# Patient Record
Sex: FEMALE | Race: WHITE | NOT HISPANIC OR LATINO | Employment: STUDENT | URBAN - METROPOLITAN AREA
[De-identification: names, ages, dates, MRNs, and addresses within clinical notes are randomized per-mention and may not be internally consistent; named-entity substitution may affect disease eponyms.]

---
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
## 2018-03-03 ENCOUNTER — HOSPITAL ENCOUNTER (EMERGENCY)
Facility: HOSPITAL | Age: 18
Discharge: HOME | End: 2018-03-03
Attending: EMERGENCY MEDICINE
Payer: MEDICAID

## 2018-03-03 VITALS
DIASTOLIC BLOOD PRESSURE: 73 MMHG | WEIGHT: 227.38 LBS | RESPIRATION RATE: 18 BRPM | BODY MASS INDEX: 42.93 KG/M2 | HEART RATE: 90 BPM | OXYGEN SATURATION: 98 % | TEMPERATURE: 97 F | HEIGHT: 61 IN | SYSTOLIC BLOOD PRESSURE: 127 MMHG

## 2018-03-03 DIAGNOSIS — J00 ACUTE NASOPHARYNGITIS: Primary | ICD-10-CM

## 2018-03-03 DIAGNOSIS — R51.9 FRONTAL HEADACHE: ICD-10-CM

## 2018-03-03 DIAGNOSIS — R07.9 CHEST PAIN: ICD-10-CM

## 2018-03-03 DIAGNOSIS — R05.9 COUGH: ICD-10-CM

## 2018-03-03 PROCEDURE — 93010 ELECTROCARDIOGRAM REPORT: CPT | Mod: ,,, | Performed by: NUCLEAR MEDICINE

## 2018-03-03 PROCEDURE — 99283 EMERGENCY DEPT VISIT LOW MDM: CPT | Mod: 25

## 2018-03-03 PROCEDURE — 93005 ELECTROCARDIOGRAM TRACING: CPT

## 2018-03-03 RX ORDER — IBUPROFEN 600 MG/1
600 TABLET ORAL EVERY 6 HOURS PRN
Qty: 30 TABLET | Refills: 0 | Status: SHIPPED | OUTPATIENT
Start: 2018-03-03 | End: 2023-06-19

## 2018-03-03 RX ORDER — PREDNISONE 20 MG/1
TABLET ORAL
Qty: 18 TABLET | Refills: 0 | OUTPATIENT
Start: 2018-03-03 | End: 2020-10-29

## 2018-03-03 RX ORDER — PREDNISONE 20 MG/1
TABLET ORAL
Qty: 18 TABLET | Refills: 0 | Status: SHIPPED | OUTPATIENT
Start: 2018-03-03 | End: 2018-03-03

## 2018-03-03 RX ORDER — IBUPROFEN 600 MG/1
600 TABLET ORAL EVERY 6 HOURS PRN
Qty: 30 TABLET | Refills: 0 | Status: SHIPPED | OUTPATIENT
Start: 2018-03-03 | End: 2018-03-03

## 2018-03-03 NOTE — ED PROVIDER NOTES
Encounter Date: 3/3/2018       History     Chief Complaint   Patient presents with    Headache     pt reports has history of headaches with Imitrex but hasn't taken in a week    Chest Pain     seen here for same complaint in the past and was dx with anxiety reaction, pt states pain x 1 month, pain is dull and aching pain this time but sharp pain last time      The history is provided by the patient.   Headache    This is a recurrent problem. The current episode started in the past 7 days. The problem occurs intermittently. The problem has been waxing and waning. The pain is located in the frontal and right unilateral region. The pain does not radiate. The pain quality is similar to prior headaches. The quality of the pain is described as pressure. The pain is at a severity of 7/10. Associated symptoms include coughing and sinus pressure. Pertinent negatives include no abdominal pain, anorexia, back pain, blurred vision, dizziness, drainage, ear pain, eye pain, eye redness, eye watering, fever, insomnia, loss of balance, muscle aches, nausea, neck pain, numbness, phonophobia, photophobia, scalp tenderness, sore throat, swollen glands, vomiting or weakness. She has tried nothing (patient denies taking her Imitrex or any other medications for her discomfort) for the symptoms. Her past medical history is significant for migraine headaches and obesity. There is no history of hypertension.   Chest Pain   The current episode started several days ago. Chest pain occurs intermittently. The pain is associated with coughing. The chest pain is currently at 7/10. The quality of the pain is described as aching. The pain does not radiate. Chest pain is worsened by certain positions and deep breathing (and coughing). Primary symptoms include fatigue and cough. Pertinent negatives for primary symptoms include no fever, no shortness of breath, no wheezing, no palpitations, no abdominal pain, no nausea, no vomiting and no dizziness.    The cough began more than 1 week ago. The cough is dry and non-productive.   Pertinent negatives for associated symptoms include no claudication, no diaphoresis, no lower extremity edema, no near-syncope, no numbness, no orthopnea, no paroxysmal nocturnal dyspnea and no weakness. She tried nothing for the symptoms. Risk factors include obesity and oral contraceptive use.   Her past medical history is significant for anxiety/panic attacks.   Pertinent negatives for past medical history include no CAD, no diabetes, no DVT, no hyperlipidemia, no hypertension and no TIA.   Her family medical history is significant for CAD, diabetes, heart disease, hyperlipidemia and hypertension.   URI   The primary symptoms include fatigue, headaches and cough. Primary symptoms do not include fever, ear pain, sore throat, swollen glands, wheezing, abdominal pain, nausea, vomiting, myalgias, arthralgias or rash. The current episode started several weeks ago. This is a recurrent problem. Progression since onset: waxing and waning.   The headache began more than 2 days ago. The headache is present intermittently. The pain from the headache is at a severity of 7/10. Location/region(s) of the headache: frontal and right unilateral. The headache is not associated with photophobia, eye pain or loss of balance.   The cough began more than 1 week ago. The cough is dry and non-productive.   Symptoms associated with the illness include sinus pressure and congestion. The illness is not associated with chills. The following treatments were addressed: Acetaminophen was not tried. A decongestant was not tried. NSAIDs were not tried.         PCP:   [facility]      Review of patient's allergies indicates:  No Known Allergies  Past Medical History:   Diagnosis Date    Anxiety     Bipolar disorder     Migraine headache     Obesity     Restless leg syndrome     Scoliosis      Past Surgical History:   Procedure Laterality Date    CYST  REMOVAL      TYMPANOSTOMY TUBE PLACEMENT      Age 2     Family History   Problem Relation Age of Onset    Cancer Mother      lymph node    Diabetes Mother      Type 2    Obesity Mother     Melanoma Father     Hyperlipidemia Maternal Grandmother     Osteoporosis Maternal Grandmother     Hyperlipidemia Maternal Grandfather     Hypertension Maternal Grandfather     Kidney disease Maternal Grandfather     Lupus Paternal Grandmother      Social History   Substance Use Topics    Smoking status: Never Smoker    Smokeless tobacco: Never Used    Alcohol use No     Review of Systems   Constitutional: Positive for fatigue. Negative for chills, diaphoresis and fever.   HENT: Positive for congestion, postnasal drip and sinus pressure. Negative for ear pain and sore throat.    Eyes: Negative for blurred vision, photophobia, pain and redness.   Respiratory: Positive for cough. Negative for shortness of breath and wheezing.    Cardiovascular: Positive for chest pain (chest wall). Negative for palpitations, orthopnea, claudication and near-syncope.   Gastrointestinal: Negative for abdominal pain, anorexia, diarrhea, nausea and vomiting.   Genitourinary: Negative for dysuria.   Musculoskeletal: Negative for arthralgias, back pain, myalgias and neck pain.   Skin: Negative for rash.   Neurological: Positive for headaches. Negative for dizziness, weakness, light-headedness, numbness and loss of balance.   Hematological: Does not bruise/bleed easily.   Psychiatric/Behavioral: Negative for agitation and confusion. The patient does not have insomnia.        Physical Exam     Initial Vitals [03/03/18 1404]   BP Pulse Resp Temp SpO2   127/73 90 18 97.3 °F (36.3 °C) 98 %      MAP       91         Physical Exam    Nursing note and vitals reviewed.  Constitutional: Vital signs are normal. She appears well-developed and well-nourished. She is Obese . She is cooperative. She does not appear ill. No distress.   HENT:   Head:  Normocephalic and atraumatic.   Right Ear: Hearing, tympanic membrane, external ear and ear canal normal.   Left Ear: Hearing, tympanic membrane, external ear and ear canal normal.   Nose: Mucosal edema present. Right sinus exhibits frontal sinus tenderness.   Mouth/Throat: Uvula is midline, oropharynx is clear and moist and mucous membranes are normal.   Eyes: Conjunctivae, EOM and lids are normal. Pupils are equal, round, and reactive to light.   Neck: Trachea normal and normal range of motion. Neck supple. No spinous process tenderness and no muscular tenderness present. No neck rigidity.   Cardiovascular: Normal rate, regular rhythm, intact distal pulses and normal pulses.   Pulmonary/Chest: Effort normal and breath sounds normal. No accessory muscle usage. No respiratory distress. She has no decreased breath sounds. She has no wheezes. She has no rhonchi. She has no rales. She exhibits tenderness (mild reproducible tenderness to bilateral upper chest wall - no crepitus or deformity noted).   Abdominal: Soft. She exhibits no distension and no mass. There is no tenderness. There is no rebound and no guarding.   Musculoskeletal: Normal range of motion. She exhibits no edema or tenderness.   Lymphadenopathy:     She has no cervical adenopathy.   Neurological: She is alert and oriented to person, place, and time. She has normal strength. Gait normal. GCS eye subscore is 4. GCS verbal subscore is 5. GCS motor subscore is 6.   Neurovascular intact to all extremities.   Skin: Skin is warm, dry and intact. Capillary refill takes less than 2 seconds. No rash noted.   Normal color and turgor.    Psychiatric: She has a normal mood and affect. Her speech is normal and behavior is normal. Thought content normal. Cognition and memory are normal.         ED Course   Procedures    1445 HOURS DISPOSITION: The patient is resting comfortably in no acute distress.  She is hemodynamically stable and is without objective evidence for  acute process requiring urgent intervention or hospitalization. I provided counseling to patient with regard to condition, the treatment plan, specific conditions for return, and the importance of follow up. Detailed written and verbal instructions provided to patient and she expressed a verbal understanding of the discharge instructions and overall management plan. Reiterated the importance of medication administration and safety and advised patient to follow up with primary care provider in 3-5 days or sooner if needed.  Answered questions at this time. The patient is stable for discharge.         EKG Readings: (Independently Interpreted)   Initial Reading: No STEMI. Previous EKG: Compared with most recent EKG Previous EKG Date: 09/26/2013. Rhythm: Normal Sinus Rhythm. Heart Rate: 76. Ectopy: No Ectopy. ST Segments: Normal ST Segments. Axis: Normal. Clinical Impression: Normal Sinus Rhythm          Medical Decision Making:   History:   Old Records Summarized: records from clinic visits.  Clinical Tests:   Medical Tests: Ordered and Reviewed                      Clinical Impression:       ICD-10-CM ICD-9-CM   1. Acute nasopharyngitis J00 460   2. Chest Pain - Noncardiac R07.9 786.50   3. Frontal headache R51 784.0   4. Cough R05 786.2         Disposition:   Disposition: Discharged  Condition: Stable  I discussed with patient that the evaluation in the emergency department does not suggest any emergent or life threatening medical condition requiring immediate intervention beyond what was provided in the ED, and I believe patient is safe for discharge.  Regardless, an unremarkable evaluation in the ED does not preclude the development or presence of a serious of life threatening condition. As such, patient was instructed to return immediately for any worsening or change in current symptoms. I also discussed the results of my evaluation and diagnosis with patient and she concurs with the evaluation and management  plan.  Detailed written and verbal instructions provided to patient and she expressed a verbal understanding of the discharge instructions and overall management plan. Reiterated the importance of medication administration and safety and advised patient to follow up with primary care provider in 3-5 days or sooner if needed.  Also advised patient to return to the ER for any complications.     Regarding UPPER RESPIRATORY ILLNESS, for treatment, I encouraged patient to: drink plenty of fluids; get lots of rest; take medications as prescribed; use over-the-counter medications for symptomatic treatment;  and use a humidifier or steam in the bathroom to improve patency of upper airway.  Patient instructed to notify primary care provider, or return to the emergency department, if the they: have a cough most days or have a cough that returns frequently; begin coughing up blood; develop a high fever or shaking chills; have a low-grade fever for three or more days; develop thick, greenish mucus, especially if it has a bad smell; and experience shortness of breath or chest pain. For prevention, discussed with patient the importance of refraining from smoking (if applicable), getting annual influenza vaccines, reducing exposure to air pollution, and the need to frequently wash hands to avoid spread of infection.     Regarding CHEST PAIN, I advised the patient that chest pain can be caused by a range of conditions, from not serious to life-threatening such as: heart attack or a blood clot in your lungs, angina indicating poor blood flow to the heart; infection, inflammation, or a fracture in the bones or cartilage in chest wall; a digestive problem, such as acid reflux or a stomach ulcer; or even an anxiety attack.  Instructed patient to follow up with primary healthcare provider for reevaluation and possible diagnostic studies to find the actual cause of the chest pain. Patient was instructed to call 911 or go to the nearest  emergency department if they develop any of the following signs of a heart attack: squeezing, pressure, or pain in the chest that lasts longer than five minutes or returns; discomfort or pain in the back, neck, jaw, stomach, or arm; trouble breathing; nausea or vomiting; lightheadedness;  or a sudden cold sweat, especially with chest pain or trouble breathing.  Also return to the emergency department the chest discomfort gets worse (even with medicine); cough or vomit blood; have bowel movements that are black or bloody; cannot stop vomiting; or develop difficulty swallowing.    Patient's headache is consistent with previous headaches and lacks features concerning for emergent or life threatening condition.  I do not suspect SAH, meningitis, increased IC pressure, infectious, toxic, vascular, CNS, or other EMC.  I have discussed this at length with patient.  Regarding treatment, advised patient to take nonsteroidal antiinflammatory medications, acetaminophen, or any medications prescribed as instructed.  To prevent headaches, patient advised to avoid muscle tension (do not stay in one position for long periods of time), avoid eye strain (make sure there is adequate lighting for reading and routine tasks), eat healthy foods, exercise, and do not smoke or drink excessive alcohol.  Patient also advised to avoid overuse of over-the-counter or prescription medications. Patient was instructed to contact primary healthcare provider if: headaches continue to get worse; occur often enough that they affect daily work or normal activities; frequent medication use is needed to manage headaches; headaches that worsen and cause vomiting; or there are any questions or concerns about the condition or care. Advised patient to return to the emergency department or call 911 if they develop a sudden headache that presents suddenly and much worse than usual headaches; have difficulty seeing, speaking, or moving; become confused or have  seizure activity; or develop a fever and stiff neck with the headache.           Discharge Medication List as of 3/3/2018  2:49 PM   Prescription provided for Ibuprofen 600 mg tablet (one tablet by mouth every 6 hours as needed for pain) & Prednisone 20 mg tablet (1 tablet TID x 3 days, 1 tablet BID x 3 days and 1 tablet daily x 3 days)         Follow-up Information     Schedule an appointment as soon as possible for a visit  with [facility] [place].    Contact information:  [id number]  [place] [address] [phone]             Go to  [facility].    Why:  As needed  Contact information:  [address] [facility] [address]                                  Prosper Sung NP  03/03/18 4396

## 2018-03-03 NOTE — ED NOTES
LOC: The patient is awake, alert and aware of environment with an appropriate affect, the patient is oriented x 3 and speaking appropriately.  APPEARANCE: Patient resting comfortably and in no acute distress, patient is clean and well groomed, patient's clothing is properly fastened.  HEENT: Brief WNL. Except right front headache that is tender with palpation.   SKIN: Brief WNL.   MUSCULOSKELETAL: Brief WNL  RESPIRATORY: Brief WNL. Except cough x 1 year.   CARDIAC: Brief WNL. Except diffuse dull, aching chest pain x 1 month, non radiating,   GASTRO: Brief WNL  : Brief WNL  Peripheral Vasc: Brief WNL  NEURO: Brief WNL  PSYCH: Brief WNL

## 2019-08-03 ENCOUNTER — HOSPITAL ENCOUNTER (EMERGENCY)
Facility: HOSPITAL | Age: 19
Discharge: HOME | End: 2019-08-03
Attending: EMERGENCY MEDICINE
Payer: MEDICAID

## 2019-08-03 VITALS
WEIGHT: 241.38 LBS | SYSTOLIC BLOOD PRESSURE: 131 MMHG | RESPIRATION RATE: 20 BRPM | BODY MASS INDEX: 45.61 KG/M2 | TEMPERATURE: 99 F | OXYGEN SATURATION: 99 % | HEART RATE: 77 BPM | DIASTOLIC BLOOD PRESSURE: 73 MMHG

## 2019-08-03 DIAGNOSIS — M62.838 MUSCLE SPASM: Primary | ICD-10-CM

## 2019-08-03 DIAGNOSIS — F41.9 ANXIETY: ICD-10-CM

## 2019-08-03 LAB
ALBUMIN SERPL BCP-MCNC: 3.2 G/DL (ref 3.5–5.2)
ALP SERPL-CCNC: 59 U/L (ref 55–135)
ALT SERPL W/O P-5'-P-CCNC: 11 U/L (ref 10–44)
AMPHET+METHAMPHET UR QL: NEGATIVE
ANION GAP SERPL CALC-SCNC: 7 MMOL/L (ref 8–16)
AST SERPL-CCNC: 10 U/L (ref 10–40)
B-HCG UR QL: NEGATIVE
BARBITURATES UR QL SCN>200 NG/ML: NEGATIVE
BASOPHILS # BLD AUTO: 0.04 K/UL (ref 0–0.2)
BASOPHILS NFR BLD: 0.4 % (ref 0–1.9)
BENZODIAZ UR QL SCN>200 NG/ML: NORMAL
BILIRUB SERPL-MCNC: 0.3 MG/DL (ref 0.1–1)
BUN SERPL-MCNC: 13 MG/DL (ref 6–20)
BZE UR QL SCN: NEGATIVE
CALCIUM SERPL-MCNC: 8.6 MG/DL (ref 8.7–10.5)
CANNABINOIDS UR QL SCN: NEGATIVE
CHLORIDE SERPL-SCNC: 108 MMOL/L (ref 95–110)
CO2 SERPL-SCNC: 24 MMOL/L (ref 23–29)
CREAT SERPL-MCNC: 0.8 MG/DL (ref 0.5–1.4)
CREAT UR-MCNC: 210.5 MG/DL (ref 15–325)
DIFFERENTIAL METHOD: NORMAL
EOSINOPHIL # BLD AUTO: 0.1 K/UL (ref 0–0.5)
EOSINOPHIL NFR BLD: 1.4 % (ref 0–8)
ERYTHROCYTE [DISTWIDTH] IN BLOOD BY AUTOMATED COUNT: 13.3 % (ref 11.5–14.5)
EST. GFR  (AFRICAN AMERICAN): >60 ML/MIN/1.73 M^2
EST. GFR  (NON AFRICAN AMERICAN): >60 ML/MIN/1.73 M^2
GLUCOSE SERPL-MCNC: 99 MG/DL (ref 70–110)
HCT VFR BLD AUTO: 40.3 % (ref 37–48.5)
HGB BLD-MCNC: 12.9 G/DL (ref 12–16)
LYMPHOCYTES # BLD AUTO: 2.8 K/UL (ref 1–4.8)
LYMPHOCYTES NFR BLD: 28.4 % (ref 18–48)
MCH RBC QN AUTO: 29.1 PG (ref 27–31)
MCHC RBC AUTO-ENTMCNC: 32 G/DL (ref 32–36)
MCV RBC AUTO: 91 FL (ref 82–98)
METHADONE UR QL SCN>300 NG/ML: NEGATIVE
MONOCYTES # BLD AUTO: 0.8 K/UL (ref 0.3–1)
MONOCYTES NFR BLD: 8.5 % (ref 4–15)
NEUTROPHILS # BLD AUTO: 6 K/UL (ref 1.8–7.7)
NEUTROPHILS NFR BLD: 61.3 % (ref 38–73)
OPIATES UR QL SCN: NEGATIVE
PCP UR QL SCN>25 NG/ML: NEGATIVE
PLATELET # BLD AUTO: 338 K/UL (ref 150–350)
PMV BLD AUTO: 9.2 FL (ref 9.2–12.9)
POTASSIUM SERPL-SCNC: 4 MMOL/L (ref 3.5–5.1)
PROT SERPL-MCNC: 7 G/DL (ref 6–8.4)
RBC # BLD AUTO: 4.43 M/UL (ref 4–5.4)
SODIUM SERPL-SCNC: 139 MMOL/L (ref 136–145)
TOXICOLOGY INFORMATION: NORMAL
WBC # BLD AUTO: 9.82 K/UL (ref 3.9–12.7)

## 2019-08-03 PROCEDURE — 81025 URINE PREGNANCY TEST: CPT | Mod: ER

## 2019-08-03 PROCEDURE — 96372 THER/PROPH/DIAG INJ SC/IM: CPT | Mod: ER

## 2019-08-03 PROCEDURE — 63600175 PHARM REV CODE 636 W HCPCS: Mod: ER | Performed by: EMERGENCY MEDICINE

## 2019-08-03 PROCEDURE — 99284 EMERGENCY DEPT VISIT MOD MDM: CPT | Mod: 25,ER

## 2019-08-03 PROCEDURE — 80307 DRUG TEST PRSMV CHEM ANLYZR: CPT | Mod: ER

## 2019-08-03 PROCEDURE — 85025 COMPLETE CBC W/AUTO DIFF WBC: CPT | Mod: ER

## 2019-08-03 PROCEDURE — 80053 COMPREHEN METABOLIC PANEL: CPT | Mod: ER

## 2019-08-03 RX ADMIN — LORAZEPAM 1 MG: 2 INJECTION INTRAMUSCULAR; INTRAVENOUS at 01:08

## 2019-08-03 NOTE — ED PROVIDER NOTES
"Encounter Date: 8/3/2019       History     Chief Complaint   Patient presents with    Spasms     c/o muscle spasms w/ being disoriented that began 20-30 mins pta. pt boyfriend states pt stated "feel like i'm having a seizure" denies hx of seizure. pt AAOx4 in triage.      Significant history of anxiety and depression, stopped Wellbutrin two weeks ago because it was causing unspecified problems, still taking her Xanax.  Last and excess yesterday.  Unclear if she is still taking Prozac.  Denies chance of current pregnancy, never pregnant before.  Underlying history of bipolar disease, restless leg syndrome migraines, anxiety, and obesity.  Tonight shortly before arrival while lying down she began having episodes of generalized muscle spasms and stated that she felt like she was having seizure.  No seizure activity witnessed.  No tongue biting or incontinence.  No history of seizures.  Has had similar episodes of spasms in the past.  Denies recent illness, alcohol, drugs, other complaints. On arrival, anxious with intermittent relatively nonspecific generalized muscular contractions or spasms that seem predominantly under voluntary control.    The history is provided by the patient and a significant other. No  was used.     Review of patient's allergies indicates:  No Known Allergies  Past Medical History:   Diagnosis Date    Anxiety     Bipolar disorder     Migraine headache     Obesity     Restless leg syndrome     Scoliosis      Past Surgical History:   Procedure Laterality Date    CYST REMOVAL      TYMPANOSTOMY TUBE PLACEMENT      Age 2     Family History   Problem Relation Age of Onset    Cancer Mother         lymph node    Diabetes Mother         Type 2    Obesity Mother     Melanoma Father     Hyperlipidemia Maternal Grandmother     Osteoporosis Maternal Grandmother     Hyperlipidemia Maternal Grandfather     Hypertension Maternal Grandfather     Kidney disease Maternal " Grandfather     Lupus Paternal Grandmother      Social History     Tobacco Use    Smoking status: Never Smoker    Smokeless tobacco: Never Used   Substance Use Topics    Alcohol use: Yes     Comment: occ    Drug use: No     Review of Systems   Constitutional: Negative for activity change, fatigue and fever.   HENT: Negative for congestion, ear pain, facial swelling, nosebleeds, sinus pressure and sore throat.    Eyes: Negative for pain, discharge, redness and visual disturbance.   Respiratory: Negative for cough, choking, chest tightness, shortness of breath and wheezing.    Cardiovascular: Negative for chest pain, palpitations and leg swelling.   Gastrointestinal: Negative for abdominal distention, abdominal pain, nausea and vomiting.   Endocrine: Negative for heat intolerance, polydipsia and polyuria.   Genitourinary: Negative for difficulty urinating, dysuria, flank pain, hematuria and urgency.   Musculoskeletal: Negative for back pain, gait problem, joint swelling and myalgias.   Skin: Negative for color change and rash.   Allergic/Immunologic: Negative for environmental allergies and food allergies.   Neurological: Negative for dizziness, weakness, numbness and headaches.        See HPI   Hematological: Negative for adenopathy. Does not bruise/bleed easily.   Psychiatric/Behavioral: Negative for agitation and behavioral problems. The patient is nervous/anxious.    All other systems reviewed and are negative.      Physical Exam     Initial Vitals [08/03/19 0048]   BP Pulse Resp Temp SpO2   (!) 146/87 86 20 98.7 °F (37.1 °C) 98 %      MAP       --         Physical Exam    Nursing note and vitals reviewed.  Constitutional: She appears well-developed and well-nourished. She is not diaphoretic. No distress.   Anxious, mildly obese   HENT:   Head: Normocephalic and atraumatic.   Mouth/Throat: No oropharyngeal exudate.   Eyes: Conjunctivae and EOM are normal. Pupils are equal, round, and reactive to light. Right  eye exhibits no discharge. Left eye exhibits no discharge. No scleral icterus.   Neck: Normal range of motion. Neck supple. No thyromegaly present. No tracheal deviation present. No JVD present.   Cardiovascular: Normal rate, regular rhythm, normal heart sounds and intact distal pulses. Exam reveals no gallop and no friction rub.    No murmur heard.  Pulmonary/Chest: Breath sounds normal. No stridor. No respiratory distress. She has no wheezes. She has no rhonchi. She has no rales. She exhibits no tenderness.   Abdominal: Soft. Bowel sounds are normal. She exhibits no distension and no mass. There is no tenderness. There is no rebound and no guarding.   Musculoskeletal: Normal range of motion. She exhibits no edema or tenderness.   Intermittent mild diffuse muscle spasms; no seizure activity observed   Neurological: She is alert and oriented to person, place, and time. She has normal strength.   Skin: Skin is warm and dry. No rash and no abscess noted. No erythema.   Psychiatric: She has a normal mood and affect. Her behavior is normal. Judgment and thought content normal.   Moderately anxious, mildly depressed         ED Course   Procedures  Labs Reviewed   COMPREHENSIVE METABOLIC PANEL - Abnormal; Notable for the following components:       Result Value    Calcium 8.6 (*)     Albumin 3.2 (*)     Anion Gap 7 (*)     All other components within normal limits   CBC W/ AUTO DIFFERENTIAL   PREGNANCY TEST, URINE RAPID   DRUG SCREEN PANEL, URINE EMERGENCY          Imaging Results    None       2:07 AM Calm, resting comfortably, symptoms have resolved.  Discussed in detail.  Will follow up with her doctor in 12 days as already scheduled.  I do not see any evidence for true seizure and have counseled her as such.  Symptoms seem predominantly related to anxiety.                            Clinical Impression:     1. Muscle spasm    2. Anxiety         Disposition:   Disposition: Discharged  Condition:  Stable                        Mehul Aguilar MD  08/03/19 0209

## 2019-08-03 NOTE — DISCHARGE INSTRUCTIONS
_____________    Keep appointment 8/15 as scheduled.    I do not think you are having true seizures.    _______________

## 2019-12-13 ENCOUNTER — HOSPITAL ENCOUNTER (EMERGENCY)
Facility: HOSPITAL | Age: 19
Discharge: HOME | End: 2019-12-13
Attending: EMERGENCY MEDICINE
Payer: MEDICAID

## 2019-12-13 VITALS
WEIGHT: 239.63 LBS | RESPIRATION RATE: 20 BRPM | OXYGEN SATURATION: 96 % | BODY MASS INDEX: 45.28 KG/M2 | TEMPERATURE: 101 F | HEART RATE: 113 BPM | SYSTOLIC BLOOD PRESSURE: 121 MMHG | DIASTOLIC BLOOD PRESSURE: 71 MMHG

## 2019-12-13 DIAGNOSIS — B34.9 VIRAL SYNDROME: ICD-10-CM

## 2019-12-13 DIAGNOSIS — J10.1 INFLUENZA B: Primary | ICD-10-CM

## 2019-12-13 LAB
INFLUENZA A, MOLECULAR: NEGATIVE
INFLUENZA B, MOLECULAR: POSITIVE
SPECIMEN SOURCE: ABNORMAL

## 2019-12-13 PROCEDURE — 25000003 PHARM REV CODE 250: Mod: ER | Performed by: EMERGENCY MEDICINE

## 2019-12-13 PROCEDURE — 87502 INFLUENZA DNA AMP PROBE: CPT | Mod: ER

## 2019-12-13 PROCEDURE — 99283 EMERGENCY DEPT VISIT LOW MDM: CPT | Mod: ER

## 2019-12-13 RX ORDER — OSELTAMIVIR PHOSPHATE 75 MG/1
75 CAPSULE ORAL 2 TIMES DAILY
Qty: 10 CAPSULE | Refills: 0 | Status: SHIPPED | OUTPATIENT
Start: 2019-12-13 | End: 2019-12-18

## 2019-12-13 RX ORDER — IBUPROFEN 200 MG
600 TABLET ORAL
Status: COMPLETED | OUTPATIENT
Start: 2019-12-13 | End: 2019-12-13

## 2019-12-13 RX ADMIN — IBUPROFEN 600 MG: 200 TABLET, FILM COATED ORAL at 11:12

## 2019-12-13 NOTE — ED PROVIDER NOTES
Encounter Date: 12/13/2019       History     Chief Complaint   Patient presents with    Chills     thinks she has the flu     The history is provided by the patient.   Fever   Primary symptoms of the febrile illness include fever, cough, nausea and myalgias. Primary symptoms do not include shortness of breath, dysuria or rash. The current episode started yesterday.   The maximum temperature recorded prior to her arrival was 101 to 101.9 F.   The cough began yesterday. The cough is non-productive.   Nausea began yesterday.   The myalgias are not associated with weakness.     Review of patient's allergies indicates:  No Known Allergies  Past Medical History:   Diagnosis Date    Anxiety     Bipolar disorder     Migraine headache     Obesity     Restless leg syndrome     Scoliosis      Past Surgical History:   Procedure Laterality Date    CYST REMOVAL      TYMPANOSTOMY TUBE PLACEMENT      Age 2     Family History   Problem Relation Age of Onset    Cancer Mother         lymph node    Diabetes Mother         Type 2    Obesity Mother     Melanoma Father     Hyperlipidemia Maternal Grandmother     Osteoporosis Maternal Grandmother     Hyperlipidemia Maternal Grandfather     Hypertension Maternal Grandfather     Kidney disease Maternal Grandfather     Lupus Paternal Grandmother      Social History     Tobacco Use    Smoking status: Never Smoker    Smokeless tobacco: Never Used   Substance Use Topics    Alcohol use: Yes     Comment: occ    Drug use: No     Review of Systems   Constitutional: Positive for fever.   HENT: Negative for sore throat.    Respiratory: Positive for cough. Negative for shortness of breath.    Cardiovascular: Negative for chest pain.   Gastrointestinal: Positive for nausea.   Genitourinary: Negative for dysuria.   Musculoskeletal: Positive for myalgias. Negative for back pain.   Skin: Negative for rash.   Neurological: Negative for weakness.   Hematological: Does not bruise/bleed  easily.       Physical Exam     Initial Vitals [12/13/19 1115]   BP Pulse Resp Temp SpO2   121/71 (!) 113 20 (!) 100.8 °F (38.2 °C) 96 %      MAP       --         Physical Exam    Nursing note and vitals reviewed.  Constitutional: She appears well-developed and well-nourished. No distress.   HENT:   Head: Normocephalic and atraumatic.   Nose: Mucosal edema and rhinorrhea present.   Mouth/Throat: No uvula swelling. Posterior oropharyngeal erythema present. No oropharyngeal exudate.   Eyes: Conjunctivae and EOM are normal. Pupils are equal, round, and reactive to light.   Neck: Normal range of motion. Neck supple.   Cardiovascular: Normal rate, regular rhythm, normal heart sounds and intact distal pulses. Exam reveals no gallop and no friction rub.    No murmur heard.  Pulmonary/Chest: Breath sounds normal. No respiratory distress. She has no wheezes. She has no rhonchi. She has no rales.   Abdominal: Soft. Bowel sounds are normal. She exhibits no distension and no mass. There is no tenderness. There is no rebound and no guarding.   Musculoskeletal: Normal range of motion. She exhibits no edema or tenderness.   Neurological: She is alert and oriented to person, place, and time. She has normal strength.   Skin: Skin is warm and dry. No rash noted.   Psychiatric: She has a normal mood and affect. Thought content normal.         ED Course   Procedures  Labs Reviewed   INFLUENZA A & B BY MOLECULAR - Abnormal; Notable for the following components:       Result Value    Influenza B, Molecular Positive (*)     All other components within normal limits          Imaging Results    None                           12:20 PM: Reassessed pt at this time.  Pt states her condition has improved at this time. Discussed with pt all pertinent ED information and results. Discussed pt dx of influenza B and plan of tx. Gave pt all f/u and return to the ED instructions. All questions and concerns were addressed at this time. Pt expresses  understanding of information and instructions, and is comfortable with plan to discharge. Pt is stable for discharge.                    Clinical Impression:       ICD-10-CM ICD-9-CM   1. Influenza B J10.1 487.1   2. Viral syndrome B34.9 079.99           Disposition:   Disposition: Discharged  Condition: Stable                     Mayank Higginbotham MD  12/13/19 4834

## 2020-10-29 ENCOUNTER — HOSPITAL ENCOUNTER (EMERGENCY)
Facility: HOSPITAL | Age: 20
Discharge: HOME | End: 2020-10-29
Attending: EMERGENCY MEDICINE
Payer: MEDICAID

## 2020-10-29 VITALS
OXYGEN SATURATION: 97 % | WEIGHT: 246.81 LBS | DIASTOLIC BLOOD PRESSURE: 73 MMHG | SYSTOLIC BLOOD PRESSURE: 122 MMHG | BODY MASS INDEX: 46.6 KG/M2 | HEART RATE: 85 BPM | TEMPERATURE: 98 F | HEIGHT: 61 IN | RESPIRATION RATE: 20 BRPM

## 2020-10-29 DIAGNOSIS — J02.9 SORE THROAT: ICD-10-CM

## 2020-10-29 DIAGNOSIS — R05.9 COUGH: ICD-10-CM

## 2020-10-29 DIAGNOSIS — J02.9 VIRAL PHARYNGITIS: Primary | ICD-10-CM

## 2020-10-29 PROCEDURE — 99283 EMERGENCY DEPT VISIT LOW MDM: CPT | Mod: ER

## 2020-10-29 RX ORDER — PREDNISONE 20 MG/1
20 TABLET ORAL DAILY
Qty: 5 TABLET | Refills: 0 | Status: SHIPPED | OUTPATIENT
Start: 2020-10-29 | End: 2020-11-03

## 2020-10-29 NOTE — ED NOTES
Aaox3, skin warm and dry,resp unlabored and even. amb with steady gait and hinson well. C/o sore throat and tonsils swollen since yest

## 2020-10-29 NOTE — ED PROVIDER NOTES
Encounter Date: 10/29/2020       History     Chief Complaint   Patient presents with    Sore Throat     sore throat since yest due to left tonsil swelling     The history is provided by the patient.   Sore Throat  This is a new problem. The current episode started yesterday. The problem occurs constantly. The problem has not changed since onset.Pertinent negatives include no chest pain, no abdominal pain, no headaches and no shortness of breath. Nothing aggravates the symptoms. Nothing relieves the symptoms.     Review of patient's allergies indicates:  No Known Allergies  Past Medical History:   Diagnosis Date    Anxiety     Bipolar disorder     Migraine headache     Obesity     Restless leg syndrome     Scoliosis      Past Surgical History:   Procedure Laterality Date    CYST REMOVAL      TYMPANOSTOMY TUBE PLACEMENT      Age 2     Family History   Problem Relation Age of Onset    Cancer Mother         lymph node    Diabetes Mother         Type 2    Obesity Mother     Melanoma Father     Hyperlipidemia Maternal Grandmother     Osteoporosis Maternal Grandmother     Hyperlipidemia Maternal Grandfather     Hypertension Maternal Grandfather     Kidney disease Maternal Grandfather     Lupus Paternal Grandmother      Social History     Tobacco Use    Smoking status: Never Smoker    Smokeless tobacco: Never Used   Substance Use Topics    Alcohol use: Yes     Comment: occ    Drug use: No     Review of Systems   Constitutional: Negative for fever.   HENT: Positive for sore throat.    Respiratory: Positive for cough. Negative for shortness of breath.    Cardiovascular: Negative for chest pain.   Gastrointestinal: Negative for abdominal pain and nausea.   Genitourinary: Negative for dysuria.   Musculoskeletal: Negative for back pain.   Skin: Negative for rash.   Neurological: Negative for weakness and headaches.   Hematological: Does not bruise/bleed easily.       Physical Exam     Initial Vitals  [10/29/20 0849]   BP Pulse Resp Temp SpO2   122/73 85 20 97.5 °F (36.4 °C) 97 %      MAP       --         Physical Exam    Nursing note and vitals reviewed.  Constitutional: She appears well-developed and well-nourished. No distress.   HENT:   Head: Normocephalic and atraumatic.   Mouth/Throat: No uvula swelling. Posterior oropharyngeal erythema present. No oropharyngeal exudate.   Uvula midline.   Eyes: Conjunctivae and EOM are normal. Pupils are equal, round, and reactive to light.   Neck: Normal range of motion. Neck supple.   Cardiovascular: Normal rate, regular rhythm and normal heart sounds. Exam reveals no gallop and no friction rub.    No murmur heard.  Pulmonary/Chest: Breath sounds normal. No respiratory distress. She has no wheezes. She has no rhonchi. She has no rales.   Abdominal: Soft. Bowel sounds are normal. She exhibits no distension and no mass. There is no abdominal tenderness. There is no rebound and no guarding.   Musculoskeletal: Normal range of motion. No tenderness or edema.   Lymphadenopathy:     She has no cervical adenopathy.   Neurological: She is alert and oriented to person, place, and time. She has normal strength.   Skin: Skin is warm and dry. No rash noted.   Psychiatric: She has a normal mood and affect. Thought content normal.         ED Course   Procedures  Labs Reviewed   HIV 1 / 2 ANTIBODY   HEPATITIS C ANTIBODY          Imaging Results    None           Centor criteria - 0.  Will treat for viral pharyngitis.                           Clinical Impression:       ICD-10-CM ICD-9-CM   1. Viral pharyngitis  J02.9 462   2. Sore throat  J02.9 462   3. Cough  R05 786.2                      Disposition:   Disposition: Discharged  Condition: Stable     ED Disposition Condition    Discharge Stable        ED Prescriptions     Medication Sig Dispense Start Date End Date Auth. Provider    predniSONE (DELTASONE) 20 MG tablet Take 1 tablet (20 mg total) by mouth once daily. for 5 days 5 tablet  10/29/2020 11/3/2020 Mayank Higginbotham MD        Follow-up Information     Follow up With Specialties Details Why Contact [facility] [place]    [id number] [place] [address]  [place] [address]  [phone]                                         Mayank Higginbotham MD  10/29/20 0903

## 2021-04-28 ENCOUNTER — PATIENT MESSAGE (OUTPATIENT)
Dept: RESEARCH | Facility: HOSPITAL | Age: 21
End: 2021-04-28

## 2021-07-24 ENCOUNTER — HOSPITAL ENCOUNTER (EMERGENCY)
Facility: HOSPITAL | Age: 21
Discharge: HOME | End: 2021-07-24
Attending: EMERGENCY MEDICINE
Payer: MEDICAID

## 2021-07-24 VITALS
WEIGHT: 250.75 LBS | BODY MASS INDEX: 47.34 KG/M2 | TEMPERATURE: 99 F | HEART RATE: 97 BPM | DIASTOLIC BLOOD PRESSURE: 79 MMHG | RESPIRATION RATE: 18 BRPM | OXYGEN SATURATION: 99 % | HEIGHT: 61 IN | SYSTOLIC BLOOD PRESSURE: 132 MMHG

## 2021-07-24 DIAGNOSIS — R51.9 NONINTRACTABLE HEADACHE, UNSPECIFIED CHRONICITY PATTERN, UNSPECIFIED HEADACHE TYPE: ICD-10-CM

## 2021-07-24 DIAGNOSIS — R11.2 NON-INTRACTABLE VOMITING WITH NAUSEA, UNSPECIFIED VOMITING TYPE: Primary | ICD-10-CM

## 2021-07-24 LAB
B-HCG UR QL: NEGATIVE
BILIRUB UR QL STRIP: NEGATIVE
CLARITY UR REFRACT.AUTO: CLEAR
COLOR UR AUTO: YELLOW
CTP QC/QA: YES
CTP QC/QA: YES
GLUCOSE UR QL STRIP: NEGATIVE
HGB UR QL STRIP: ABNORMAL
KETONES UR QL STRIP: ABNORMAL
LEUKOCYTE ESTERASE UR QL STRIP: NEGATIVE
NITRITE UR QL STRIP: NEGATIVE
PH UR STRIP: 6 [PH] (ref 5–8)
POC MOLECULAR INFLUENZA A AGN: NEGATIVE
POC MOLECULAR INFLUENZA B AGN: NEGATIVE
PROT UR QL STRIP: NEGATIVE
SARS-COV-2 RDRP RESP QL NAA+PROBE: NEGATIVE
SP GR UR STRIP: 1.02 (ref 1–1.03)
URN SPEC COLLECT METH UR: ABNORMAL
UROBILINOGEN UR STRIP-ACNC: NEGATIVE EU/DL

## 2021-07-24 PROCEDURE — 81003 URINALYSIS AUTO W/O SCOPE: CPT | Mod: ER | Performed by: NURSE PRACTITIONER

## 2021-07-24 PROCEDURE — U0002 COVID-19 LAB TEST NON-CDC: HCPCS | Mod: ER | Performed by: NURSE PRACTITIONER

## 2021-07-24 PROCEDURE — 25000003 PHARM REV CODE 250: Mod: ER | Performed by: NURSE PRACTITIONER

## 2021-07-24 PROCEDURE — 99284 EMERGENCY DEPT VISIT MOD MDM: CPT | Mod: 25,ER

## 2021-07-24 PROCEDURE — 81025 URINE PREGNANCY TEST: CPT | Mod: ER | Performed by: NURSE PRACTITIONER

## 2021-07-24 RX ORDER — BUTALBITAL, ACETAMINOPHEN AND CAFFEINE 50; 325; 40 MG/1; MG/1; MG/1
1 TABLET ORAL
Status: COMPLETED | OUTPATIENT
Start: 2021-07-24 | End: 2021-07-24

## 2021-07-24 RX ORDER — ONDANSETRON 4 MG/1
4 TABLET, ORALLY DISINTEGRATING ORAL
Status: COMPLETED | OUTPATIENT
Start: 2021-07-24 | End: 2021-07-24

## 2021-07-24 RX ORDER — ONDANSETRON 4 MG/1
4 TABLET, ORALLY DISINTEGRATING ORAL EVERY 6 HOURS PRN
Qty: 15 TABLET | Refills: 0 | Status: SHIPPED | OUTPATIENT
Start: 2021-07-24 | End: 2023-06-19

## 2021-07-24 RX ORDER — BUTALBITAL, ACETAMINOPHEN AND CAFFEINE 50; 325; 40 MG/1; MG/1; MG/1
1 TABLET ORAL EVERY 4 HOURS PRN
Qty: 15 TABLET | Refills: 0 | Status: SHIPPED | OUTPATIENT
Start: 2021-07-24 | End: 2021-08-23

## 2021-07-24 RX ADMIN — BUTALBITAL, ACETAMINOPHEN AND CAFFEINE 1 TABLET: 50; 325; 40 TABLET ORAL at 08:07

## 2021-07-24 RX ADMIN — ONDANSETRON 4 MG: 4 TABLET, ORALLY DISINTEGRATING ORAL at 08:07

## 2022-12-07 ENCOUNTER — HOSPITAL ENCOUNTER (EMERGENCY)
Facility: HOSPITAL | Age: 22
Discharge: HOME | End: 2022-12-07
Attending: EMERGENCY MEDICINE
Payer: MEDICAID

## 2022-12-07 VITALS
HEART RATE: 114 BPM | DIASTOLIC BLOOD PRESSURE: 80 MMHG | OXYGEN SATURATION: 97 % | HEIGHT: 61 IN | BODY MASS INDEX: 46.82 KG/M2 | RESPIRATION RATE: 18 BRPM | SYSTOLIC BLOOD PRESSURE: 129 MMHG | WEIGHT: 248 LBS | TEMPERATURE: 100 F

## 2022-12-07 DIAGNOSIS — J10.1 INFLUENZA A: Primary | ICD-10-CM

## 2022-12-07 LAB
B-HCG UR QL: NEGATIVE
BACTERIA #/AREA URNS AUTO: ABNORMAL /HPF
BILIRUB UR QL STRIP: NEGATIVE
CLARITY UR REFRACT.AUTO: ABNORMAL
COLOR UR AUTO: YELLOW
CTP QC/QA: YES
CTP QC/QA: YES
GLUCOSE UR QL STRIP: NEGATIVE
GROUP A STREP, MOLECULAR: NEGATIVE
HGB UR QL STRIP: ABNORMAL
HYALINE CASTS UR QL AUTO: 0 /LPF
KETONES UR QL STRIP: NEGATIVE
LEUKOCYTE ESTERASE UR QL STRIP: NEGATIVE
MICROSCOPIC COMMENT: ABNORMAL
NITRITE UR QL STRIP: NEGATIVE
PH UR STRIP: 6 [PH] (ref 5–8)
POC MOLECULAR INFLUENZA A AGN: POSITIVE
POC MOLECULAR INFLUENZA B AGN: NEGATIVE
PROT UR QL STRIP: ABNORMAL
RBC #/AREA URNS AUTO: 2 /HPF (ref 0–4)
SARS-COV-2 RDRP RESP QL NAA+PROBE: NEGATIVE
SP GR UR STRIP: 1.02 (ref 1–1.03)
SQUAMOUS #/AREA URNS AUTO: 6 /HPF
URN SPEC COLLECT METH UR: ABNORMAL
UROBILINOGEN UR STRIP-ACNC: NEGATIVE EU/DL
WBC #/AREA URNS AUTO: 5 /HPF (ref 0–5)

## 2022-12-07 PROCEDURE — 81025 URINE PREGNANCY TEST: CPT | Mod: ER | Performed by: PHYSICIAN ASSISTANT

## 2022-12-07 PROCEDURE — 25000003 PHARM REV CODE 250: Mod: ER | Performed by: PHYSICIAN ASSISTANT

## 2022-12-07 PROCEDURE — 87651 STREP A DNA AMP PROBE: CPT | Mod: ER | Performed by: PHYSICIAN ASSISTANT

## 2022-12-07 PROCEDURE — 87635 SARS-COV-2 COVID-19 AMP PRB: CPT | Mod: ER | Performed by: PHYSICIAN ASSISTANT

## 2022-12-07 PROCEDURE — 99282 EMERGENCY DEPT VISIT SF MDM: CPT | Mod: ER

## 2022-12-07 PROCEDURE — 81000 URINALYSIS NONAUTO W/SCOPE: CPT | Mod: ER | Performed by: PHYSICIAN ASSISTANT

## 2022-12-07 PROCEDURE — 87502 INFLUENZA DNA AMP PROBE: CPT | Mod: ER

## 2022-12-07 RX ORDER — ACETAMINOPHEN 500 MG
1000 TABLET ORAL
Status: COMPLETED | OUTPATIENT
Start: 2022-12-07 | End: 2022-12-07

## 2022-12-07 RX ADMIN — ACETAMINOPHEN 1000 MG: 500 TABLET ORAL at 05:12

## 2022-12-07 NOTE — Clinical Note
"Nataliya Mclaughlinth" Fred was seen and treated in our emergency department on 12/7/2022.  She may return to work on 12/12/2022.       If you have any questions or concerns, please don't hesitate to call.      Jazmine Fernandez PA-C"

## 2022-12-08 NOTE — ED PROVIDER NOTES
History      Chief Complaint   Patient presents with    URI     Diagnosed with URI on Saturday at urgent care. Cough, sore throat, fever, ear pain. Has been taking prescribed cough meds. Not getting any better.       Review of patient's allergies indicates:  No Known Allergies     HPI   HPI    12/7/2022, 7:18 PM   History obtained from the patient      History of Present Illness: Nataliya Ibarra is a 22 y.o. female patient who presents to the Emergency Department for flu-like symptoms, cough, nasal congestion, fever, body aches, for 4 days.  She tested negative for flu and COVID Sunday at urgent care.  Denies n/v/d.  Symptoms are constant and moderate in severity.   No further complaints or concerns at this time.           [facility]P: [facility]       Past Medical History:  Past Medical History:   Diagnosis Date    Anxiety     Bipolar disorder     Migraine headache     Obesity     Restless leg syndrome     Scoliosis          Past Surgical History:  Past Surgical History:   Procedure Laterality Date    CYST REMOVAL      TYMPANOSTOMY TUBE PLACEMENT      Age 2           Family History:  Family History   Problem Relation Age of Onset    Cancer Mother         lymph node    Diabetes Mother         Type 2    Obesity Mother     Melanoma Father     Hyperlipidemia Maternal Grandmother     Osteoporosis Maternal Grandmother     Hyperlipidemia Maternal Grandfather     Hypertension Maternal Grandfather     Kidney disease Maternal Grandfather     Lupus Paternal Grandmother            Social History:  Social History     Tobacco Use    Smoking status: Never    Smokeless tobacco: Never   Substance and Sexual Activity    Alcohol use: Yes     Comment: occ    Drug use: No    Sexual activity: Never       ROS   Review of Systems   Constitutional: Positive for chills and fever. Negative for appetite change.   HENT: Negative for mouth sores and trouble swallowing.    Respiratory: Positive for cough. Negative for shortness of  "breath.    Cardiovascular: Negative for chest pain.   Gastrointestinal: Negative for abdominal pain and vomiting.   Genitourinary: Negative for dysuria and flank pain.   Musculoskeletal: Negative for back pain, neck pain and neck stiffness.   Skin: Negative for pallor and rash.   Neurological: Negative for syncope and weakness.   Hematological: Does not bruise/bleed easily.   All other systems reviewed and are negative.      Review of Systems    Physical Exam        Initial Vitals [12/07/22 1711]   BP Pulse Resp Temp SpO2   129/80 (!) 114 18 (!) 100.4 °F (38 °C) 97 %      MAP       --         Physical Exam  Vital signs and nursing notes reviewed.  Constitutional: Patient is in NAD. Awake and alert. Well-developed and well-nourished.  Head: Atraumatic. Normocephalic.  Eyes: PERRL. EOM intact. Conjunctivae nl. No scleral icterus.  ENT: Mucous membranes are moist. Oropharynx is mildly erythematous, no exudates.  Nasal congestion.  TMs clear bilaterally.  No mastoid ttp  Neck: Supple. No JVD. No lymphadenopathy.  No meningismus  Cardiovascular: Regular rate and rhythm. No murmurs, rubs, or gallops. Distal pulses are 2+ and symmetric.  Pulmonary/Chest: No respiratory distress. Clear to auscultation bilaterally. No wheezing, rales, or rhonchi.  Abdominal: Soft. Non-distended. No TTP. No rebound, guarding, or rigidity. Good bowel sounds.  Genitourinary: No CVA tenderness  Musculoskeletal: Moves all extremities. No edema.   Skin: Warm and dry.  No rash  Neurological: Awake and alert. No acute focal neurological deficits are appreciated.  Psychiatric: Normal affect. Good eye contact. Appropriate in content.      ED Course      Procedures  ED Vital Signs:  Vitals:    12/07/22 1711 12/07/22 1751   BP: 129/80    Pulse: (!) 114    Resp: 18    Temp: (!) 100.4 °F (38 °C) (!) 100.4 °F (38 °C)   TempSrc: Oral    SpO2: 97%    Weight: 112.5 kg (248 lb 0.3 oz)    Height: 5' 1" (1.549 m)          Results for orders placed or performed " during the hospital encounter of 12/07/22   Group A Strep, Molecular    Specimen: Throat   Result Value Ref Range    Group A Strep, Molecular Negative Negative   Urinalysis, Reflex to Urine Culture Urine, Clean Catch    Specimen: Urine   Result Value Ref Range    Specimen UA Urine, Clean Catch     Color, UA Yellow Yellow, Straw, Darby    Appearance, UA Cloudy (A) Clear    pH, UA 6.0 5.0 - 8.0    Specific Gravity, UA 1.020 1.005 - 1.030    Protein, UA 2+ (A) Negative    Glucose, UA Negative Negative    Ketones, UA Negative Negative    Bilirubin (UA) Negative Negative    Occult Blood UA Trace (A) Negative    Nitrite, UA Negative Negative    Urobilinogen, UA Negative <2.0 EU/dL    Leukocytes, UA Negative Negative   Pregnancy, urine rapid   Result Value Ref Range    Preg Test, Ur Negative    Urinalysis Microscopic   Result Value Ref Range    RBC, UA 2 0 - 4 /hpf    WBC, UA 5 0 - 5 /hpf    Bacteria Moderate (A) None-Occ /hpf    Squam Epithel, UA 6 /hpf    Hyaline Casts, UA 0 0-1/lpf /lpf    Microscopic Comment SEE COMMENT    POCT Influenza A/B Molecular   Result Value Ref Range    POC Molecular Influenza A Ag Positive (A) Negative, Not Reported    POC Molecular Influenza B Ag Negative Negative, Not Reported     Acceptable Yes    POCT COVID-19 Rapid Screening   Result Value Ref Range    POC Rapid COVID Negative Negative     Acceptable Yes              Imaging Results:  Imaging Results    None            The Emergency Provider reviewed the vital signs and test results, which are outlined above.    ED Discussion         All findings were reviewed with the patient/family in detail.   All remaining questions and concerns were addressed at that time.  Patient/family has been counseled regarding the need for follow-up as well as the indication to return to the emergency room should new or worrisome developments occur.        Medication(s) given in the ER:  Medications   acetaminophen tablet 1,000  mg (1,000 mg Oral Given 12/7/22 9786)            Follow-up Information       [facility] In 2 days.    Why: As needed  Contact information:  [id number] [facility]  [place] [id number] [phone]                                    Medication List        ASK your doctor about these medications      ALPRAZolam 1 MG tablet  Commonly known as: XANAX  Take 1 tablet (1 mg total) by mouth 2 (two) times daily as needed for Anxiety.     buPROPion 150 MG TB24 tablet  Commonly known as: WELLBUTRIN XL     cetirizine 10 MG tablet  Commonly known as: ZYRTEC     FLUoxetine 20 MG capsule     ibuprofen 600 MG tablet  Commonly known as: ADVIL,MOTRIN  Take 1 tablet (600 mg total) by mouth every 6 (six) hours as needed for Pain.     norgestrel-ethinyl estradioL 0.5-50 mg-mcg per tablet  Commonly known as: OGESTROL     ondansetron 4 MG Tbdl  Commonly known as: ZOFRAN-ODT  Take 1 tablet (4 mg total) by mouth every 6 (six) hours as needed (nausea and vomiting).     sumatriptan 25 MG Tab  Commonly known as: IMITREX  Take 1 tab at onset of migraine.  May repeat once in 2 hours.                  Medical Decision Making        MDM                 Clinical Impression:        ICD-10-CM ICD-9-CM   1. Influenza A  J10.1 487.1              Jazmine Fernandez PA-C  12/07/22 1919

## 2023-01-12 ENCOUNTER — HOSPITAL ENCOUNTER (EMERGENCY)
Facility: HOSPITAL | Age: 23
Discharge: HOME | End: 2023-01-12
Attending: EMERGENCY MEDICINE
Payer: MEDICAID

## 2023-01-12 VITALS
OXYGEN SATURATION: 95 % | RESPIRATION RATE: 18 BRPM | HEART RATE: 119 BPM | BODY MASS INDEX: 46.62 KG/M2 | DIASTOLIC BLOOD PRESSURE: 74 MMHG | HEIGHT: 61 IN | WEIGHT: 246.94 LBS | TEMPERATURE: 99 F | SYSTOLIC BLOOD PRESSURE: 129 MMHG

## 2023-01-12 DIAGNOSIS — R05.3 CHRONIC COUGH: ICD-10-CM

## 2023-01-12 DIAGNOSIS — R00.0 TACHYCARDIA: ICD-10-CM

## 2023-01-12 DIAGNOSIS — R00.0 SINUS TACHYCARDIA: ICD-10-CM

## 2023-01-12 DIAGNOSIS — J18.9 PNEUMONIA OF RIGHT LOWER LOBE DUE TO INFECTIOUS ORGANISM: Primary | ICD-10-CM

## 2023-01-12 LAB
ALBUMIN SERPL BCP-MCNC: 3.4 G/DL (ref 3.5–5.2)
ALP SERPL-CCNC: 58 U/L (ref 55–135)
ALT SERPL W/O P-5'-P-CCNC: 15 U/L (ref 10–44)
ANION GAP SERPL CALC-SCNC: 13 MMOL/L (ref 8–16)
AST SERPL-CCNC: 13 U/L (ref 10–40)
BASOPHILS # BLD AUTO: 0.04 K/UL (ref 0–0.2)
BASOPHILS NFR BLD: 0.6 % (ref 0–1.9)
BILIRUB SERPL-MCNC: 0.3 MG/DL (ref 0.1–1)
BUN SERPL-MCNC: 7 MG/DL (ref 6–20)
CALCIUM SERPL-MCNC: 8.7 MG/DL (ref 8.7–10.5)
CHLORIDE SERPL-SCNC: 104 MMOL/L (ref 95–110)
CO2 SERPL-SCNC: 22 MMOL/L (ref 23–29)
CREAT SERPL-MCNC: 0.6 MG/DL (ref 0.5–1.4)
CTP QC/QA: YES
CTP QC/QA: YES
D DIMER PPP IA.FEU-MCNC: 0.38 MG/L FEU
DIFFERENTIAL METHOD: ABNORMAL
EOSINOPHIL # BLD AUTO: 0.2 K/UL (ref 0–0.5)
EOSINOPHIL NFR BLD: 2.4 % (ref 0–8)
ERYTHROCYTE [DISTWIDTH] IN BLOOD BY AUTOMATED COUNT: 13.5 % (ref 11.5–14.5)
EST. GFR  (NO RACE VARIABLE): >60 ML/MIN/1.73 M^2
GLUCOSE SERPL-MCNC: 98 MG/DL (ref 70–110)
GROUP A STREP, MOLECULAR: NEGATIVE
HCT VFR BLD AUTO: 43.1 % (ref 37–48.5)
HGB BLD-MCNC: 13.7 G/DL (ref 12–16)
IMM GRANULOCYTES # BLD AUTO: 0.06 K/UL (ref 0–0.04)
IMM GRANULOCYTES NFR BLD AUTO: 0.9 % (ref 0–0.5)
LACTATE SERPL-SCNC: 1 MMOL/L (ref 0.5–2.2)
LYMPHOCYTES # BLD AUTO: 1.9 K/UL (ref 1–4.8)
LYMPHOCYTES NFR BLD: 28.5 % (ref 18–48)
MCH RBC QN AUTO: 28.4 PG (ref 27–31)
MCHC RBC AUTO-ENTMCNC: 31.8 G/DL (ref 32–36)
MCV RBC AUTO: 89 FL (ref 82–98)
MONOCYTES # BLD AUTO: 0.6 K/UL (ref 0.3–1)
MONOCYTES NFR BLD: 9.6 % (ref 4–15)
NEUTROPHILS # BLD AUTO: 3.9 K/UL (ref 1.8–7.7)
NEUTROPHILS NFR BLD: 58 % (ref 38–73)
NRBC BLD-RTO: 0 /100 WBC
PLATELET # BLD AUTO: 270 K/UL (ref 150–450)
PMV BLD AUTO: 8.5 FL (ref 9.2–12.9)
POC MOLECULAR INFLUENZA A AGN: NEGATIVE
POC MOLECULAR INFLUENZA B AGN: NEGATIVE
POTASSIUM SERPL-SCNC: 3.5 MMOL/L (ref 3.5–5.1)
PROT SERPL-MCNC: 7.1 G/DL (ref 6–8.4)
RBC # BLD AUTO: 4.83 M/UL (ref 4–5.4)
SARS-COV-2 RDRP RESP QL NAA+PROBE: NEGATIVE
SODIUM SERPL-SCNC: 139 MMOL/L (ref 136–145)
WBC # BLD AUTO: 6.67 K/UL (ref 3.9–12.7)

## 2023-01-12 PROCEDURE — 25000003 PHARM REV CODE 250: Mod: ER | Performed by: EMERGENCY MEDICINE

## 2023-01-12 PROCEDURE — 93010 EKG 12-LEAD: ICD-10-PCS | Mod: ,,, | Performed by: INTERNAL MEDICINE

## 2023-01-12 PROCEDURE — 96365 THER/PROPH/DIAG IV INF INIT: CPT | Mod: ER

## 2023-01-12 PROCEDURE — 25000242 PHARM REV CODE 250 ALT 637 W/ HCPCS: Mod: ER | Performed by: EMERGENCY MEDICINE

## 2023-01-12 PROCEDURE — 63700000 PHARM REV CODE 250 ALT 637 W/O HCPCS: Mod: ER | Performed by: EMERGENCY MEDICINE

## 2023-01-12 PROCEDURE — 99285 EMERGENCY DEPT VISIT HI MDM: CPT | Mod: 25

## 2023-01-12 PROCEDURE — 87635 SARS-COV-2 COVID-19 AMP PRB: CPT | Mod: ER | Performed by: EMERGENCY MEDICINE

## 2023-01-12 PROCEDURE — 99291 CRITICAL CARE FIRST HOUR: CPT | Mod: 25,ER

## 2023-01-12 PROCEDURE — 93005 ELECTROCARDIOGRAM TRACING: CPT | Mod: ER

## 2023-01-12 PROCEDURE — 83605 ASSAY OF LACTIC ACID: CPT | Mod: ER | Performed by: EMERGENCY MEDICINE

## 2023-01-12 PROCEDURE — 94640 AIRWAY INHALATION TREATMENT: CPT | Mod: ER

## 2023-01-12 PROCEDURE — 93010 ELECTROCARDIOGRAM REPORT: CPT | Mod: ,,, | Performed by: INTERNAL MEDICINE

## 2023-01-12 PROCEDURE — 85379 FIBRIN DEGRADATION QUANT: CPT | Mod: ER | Performed by: EMERGENCY MEDICINE

## 2023-01-12 PROCEDURE — 87502 INFLUENZA DNA AMP PROBE: CPT | Mod: ER

## 2023-01-12 PROCEDURE — 87651 STREP A DNA AMP PROBE: CPT | Mod: ER | Performed by: EMERGENCY MEDICINE

## 2023-01-12 PROCEDURE — 80053 COMPREHEN METABOLIC PANEL: CPT | Mod: ER | Performed by: EMERGENCY MEDICINE

## 2023-01-12 PROCEDURE — 63600175 PHARM REV CODE 636 W HCPCS: Mod: ER | Performed by: EMERGENCY MEDICINE

## 2023-01-12 PROCEDURE — 87040 BLOOD CULTURE FOR BACTERIA: CPT | Performed by: EMERGENCY MEDICINE

## 2023-01-12 PROCEDURE — 85025 COMPLETE CBC W/AUTO DIFF WBC: CPT | Mod: ER | Performed by: EMERGENCY MEDICINE

## 2023-01-12 RX ORDER — AZITHROMYCIN 250 MG/1
500 TABLET, FILM COATED ORAL
Status: COMPLETED | OUTPATIENT
Start: 2023-01-12 | End: 2023-01-12

## 2023-01-12 RX ORDER — AZITHROMYCIN 250 MG/1
250 TABLET, FILM COATED ORAL DAILY
Qty: 6 TABLET | Refills: 0 | Status: SHIPPED | OUTPATIENT
Start: 2023-01-12 | End: 2023-06-19

## 2023-01-12 RX ORDER — ALBUTEROL SULFATE 0.83 MG/ML
2.5 SOLUTION RESPIRATORY (INHALATION) EVERY 6 HOURS PRN
Qty: 25 EACH | Refills: 0 | Status: SHIPPED | OUTPATIENT
Start: 2023-01-12 | End: 2023-06-19

## 2023-01-12 RX ORDER — GUAIFENESIN/DEXTROMETHORPHAN 100-10MG/5
10 SYRUP ORAL ONCE
Status: COMPLETED | OUTPATIENT
Start: 2023-01-12 | End: 2023-01-12

## 2023-01-12 RX ORDER — IPRATROPIUM BROMIDE AND ALBUTEROL SULFATE 2.5; .5 MG/3ML; MG/3ML
3 SOLUTION RESPIRATORY (INHALATION)
Status: COMPLETED | OUTPATIENT
Start: 2023-01-12 | End: 2023-01-12

## 2023-01-12 RX ADMIN — GUAIFENESIN AND DEXTROMETHORPHAN 10 ML: 100; 10 SYRUP ORAL at 10:01

## 2023-01-12 RX ADMIN — IPRATROPIUM BROMIDE AND ALBUTEROL SULFATE 3 ML: 2.5; .5 SOLUTION RESPIRATORY (INHALATION) at 10:01

## 2023-01-12 RX ADMIN — SODIUM CHLORIDE, SODIUM LACTATE, POTASSIUM CHLORIDE, AND CALCIUM CHLORIDE 500 ML: .6; .31; .03; .02 INJECTION, SOLUTION INTRAVENOUS at 11:01

## 2023-01-12 RX ADMIN — CEFTRIAXONE 1 G: 1 INJECTION, POWDER, FOR SOLUTION INTRAMUSCULAR; INTRAVENOUS at 10:01

## 2023-01-12 RX ADMIN — AZITHROMYCIN MONOHYDRATE 500 MG: 250 TABLET ORAL at 10:01

## 2023-01-12 NOTE — Clinical Note
"Nataliya Humphrey" Fred was seen and treated in our emergency department on 1/12/2023.  She may return to work on 01/18/2023.       If you have any questions or concerns, please don't hesitate to call.      Viral Vega RN    "

## 2023-01-13 NOTE — ED PROVIDER NOTES
"Encounter Date: 1/12/2023       History     Chief Complaint   Patient presents with    Cough     Pt has been having cough x2 months. Recently got worse over the last week.  Feels SOB     Chief complaint: Cough    History of present illness:  23-year-old female complains of having a cough over the last 2 months.  She states over the last week the cough has gotten "worse".  She is been seen a couple of times on an outpatient basis and was prescribed "only medicine for allergy and cough".  She denies any fever or chills.  Denies any known exposure to COVID.  Medications have been of minimal relief to her symptoms.  She relates no history of asthma.  Does have a history of anxiety and bipolar disorder.  Nonsmoker.  No complaints of chest pain other than from coughing.    Review of patient's allergies indicates:  No Known Allergies  Past Medical History:   Diagnosis Date    Anxiety     Bipolar disorder     Migraine headache     Obesity     Restless leg syndrome     Scoliosis      Past Surgical History:   Procedure Laterality Date    CYST REMOVAL      TYMPANOSTOMY TUBE PLACEMENT      Age 2     Family History   Problem Relation Age of Onset    Cancer Mother         lymph node    Diabetes Mother         Type 2    Obesity Mother     Melanoma Father     Hyperlipidemia Maternal Grandmother     Osteoporosis Maternal Grandmother     Hyperlipidemia Maternal Grandfather     Hypertension Maternal Grandfather     Kidney disease Maternal Grandfather     Lupus Paternal Grandmother      Social History     Tobacco Use    Smoking status: Never    Smokeless tobacco: Never   Substance Use Topics    Alcohol use: Yes     Comment: occ    Drug use: No     Review of Systems   Constitutional:  Negative for activity change, appetite change, chills and fever.   HENT: Negative.     Eyes: Negative.    Respiratory:  Positive for cough. Negative for chest tightness and wheezing.    Cardiovascular:  Positive for chest pain (Associated with cough). "   Gastrointestinal:  Negative for abdominal pain, nausea and vomiting.   Musculoskeletal:  Negative for arthralgias and back pain.   Neurological:  Negative for dizziness and weakness.   All other systems reviewed and are negative.    Physical Exam     Initial Vitals [01/12/23 2042]   BP Pulse Resp Temp SpO2   134/85 (!) 124 (!) 22 98.8 °F (37.1 °C) (!) 93 %      MAP       --         Physical Exam    Nursing note and vitals reviewed.  Constitutional: She appears well-nourished. No distress.   HENT:   Head: Normocephalic and atraumatic.   Right Ear: External ear normal.   Left Ear: External ear normal.   Nose: Nose normal.   Mouth/Throat: Oropharynx is clear and moist.   Eyes: Conjunctivae and EOM are normal. Right eye exhibits no discharge. Left eye exhibits no discharge. No scleral icterus.   Neck: Neck supple.   Normal range of motion.  Cardiovascular:  Regular rhythm and intact distal pulses.           Mild tachycardia   Pulmonary/Chest: No respiratory distress. She has no wheezes. She has rhonchi.   Abdominal: Abdomen is soft. She exhibits no distension.   Musculoskeletal:         General: Normal range of motion.      Cervical back: Normal range of motion and neck supple.     Neurological: She is alert and oriented to person, place, and time. She has normal strength. GCS score is 15. GCS eye subscore is 4. GCS verbal subscore is 5. GCS motor subscore is 6.   Skin: Skin is warm and dry. Capillary refill takes less than 2 seconds.   Psychiatric: She has a normal mood and affect. Her behavior is normal.       ED Course   Critical Care    Date/Time: 1/13/2023 12:26 AM  Performed by: Srinivasan Mckeon MD  Authorized by: Srinivasan Mckeon MD   Direct patient critical care time: 15 minutes  Additional history critical care time: 5 minutes  Ordering / reviewing critical care time: 4 minutes  Documentation critical care time: 8 minutes  Total critical care time (exclusive of procedural time) : 32 minutes  Critical care  was necessary to treat or prevent imminent or life-threatening deterioration of the following conditions: respiratory failure.  Critical care was time spent personally by me on the following activities: examination of patient, evaluation of patient's response to treatment, obtaining history from patient or surrogate, ordering and performing treatments and interventions, ordering and review of laboratory studies, ordering and review of radiographic studies, pulse oximetry and re-evaluation of patient's condition.      Labs Reviewed   GROUP A STREP, MOLECULAR   SARS-COV-2 RDRP GENE    Narrative:     This test utilizes isothermal nucleic acid amplification technology to detect the SARS-CoV-2 RdRp nucleic acid segment. The analytical sensitivity (limit of detection) is 500 copies/swab.     A POSITIVE result is indicative of the presence of SARS-CoV-2 RNA; clinical correlation with patient history and other diagnostic information is necessary to determine patient infection status.    A NEGATIVE result means that SARS-CoV-2 nucleic acids are not present above the limit of detection. A NEGATIVE result should be treated as presumptive. It does not rule out the possibility of COVID-19 and should not be the sole basis for treatment decisions. If COVID-19 is strongly suspected based on clinical and exposure history, re-testing using an alternate molecular assay should be considered.     This test is only for use under the Food and Drug Administration s Emergency Use Authorization (EUA).     Commercial kits are provided by [employer]. Performance characteristics of the EUA have been independently verified by [facility] Department of Pathology and Laboratory Medicine.   _________________________________________________________________   The authorized Fact Sheet for Healthcare Providers and the authorized Fact Sheet for Patients of the ID NOW COVID-19 are available on the FDA website:     https://www.fda.gov/media/849753/download      https://www.fda.gov/media/958884/download      POCT INFLUENZA A/B MOLECULAR     EKG Readings: (Independently Interpreted)   Rhythm: Sinus Tachycardia. Heart Rate: 121. Ectopy: No Ectopy. Conduction: Normal. Clinical Impression: Sinus Tachycardia Other Impression: Voltage criteria for LVH.  Borderline EKG     Imaging Results               X-Ray Chest PA And Lateral (Final result)  Result time 01/12/23 21:30:16      Final result by Demetrio Benedict MD (01/12/23 21:30:16)                   Impression:      As above.    This report was flagged in Epic as abnormal.      Electronically signed by: Demetrio Benedict  Date:    01/12/2023  Time:    21:30               Narrative:    EXAMINATION:  XR CHEST PA AND LATERAL    CLINICAL HISTORY:  Chronic cough    TECHNIQUE:  PA and lateral views of the chest were performed.    COMPARISON:  09/26/2013    FINDINGS:  Patchy right mid and lower lung zone opacities concerning for infection.  Correlation is advised.  No pleural fluid or pneumothorax.    The cardiac silhouette is normal in size. The hilar and mediastinal contours are unremarkable.    Bones are intact.                                       Medications - No data to display  Medical Decision Making:   Initial Assessment:   Paroxysmal cough non-productive but in NO respiratory distress. No audible wheezing.  P is primary historian with Mom at bedside with agreement with history.    Differential Diagnosis:   Pneumonia/Cough variant asthma/Bronchitis/URI  Had Influenza 1 month ago.  Clinical Tests:   Lab Tests: Ordered and Reviewed  The following lab test(s) were unremarkable: CBC and CMP  Radiological Study: Ordered and Reviewed  ED Management:  Xray reveals both RLL and RML infiltrates.  Negative Covid and Influenza study.  Will administer vani for hydrating effect and possibly reduce paroxysms of cough.  Pt has a good outside source for followup of the pneumonia and will likely  not require hospitalization    10:45 pm:  Pt feels neb did help quite a bit and coughing less. Mild tachycardia on arrival and still after neb but comfortable. Will give 500ml bolus of LR and DC with Nebulizer for home use. 02 sats consistently 93-95% on room air.    D dimer is negative ( Pt on Oral contraceptives).   Additional MDM:   Pneumonia: Status: The patient's pulse oximetry was normal (%). Blood Cultures: 2 blood cultures were done before antibiotics.  The patient has a community acquired pneumonia and is being treated with Ceftriaxone 1 g IVPB and Azithromycin 500 mg PO. Condition: The patient's condition was felt to be stable.                      Clinical Impression:        ICD-10-CM ICD-9-CM   1. Pneumonia of right lower lobe due to infectious organism  J18.9 486   2. Chronic cough  R05.3 786.2   3. Tachycardia  R00.0 785.0   4. Sinus tachycardia  R00.0 427.89               Srinivasan Mckeon MD  01/13/23 0029

## 2023-01-18 LAB
BACTERIA BLD CULT: NORMAL
BACTERIA BLD CULT: NORMAL

## 2023-03-15 ENCOUNTER — HOSPITAL ENCOUNTER (OUTPATIENT)
Dept: RADIOLOGY | Facility: HOSPITAL | Age: 23
Discharge: HOME | End: 2023-03-15
Attending: FAMILY MEDICINE
Payer: MEDICAID

## 2023-03-15 DIAGNOSIS — N63.0 LUMP OR MASS IN BREAST: ICD-10-CM

## 2023-03-15 DIAGNOSIS — N63.20 MASS OF LEFT BREAST: ICD-10-CM

## 2023-03-15 PROCEDURE — 76642 ULTRASOUND BREAST LIMITED: CPT | Mod: TC,LT

## 2023-03-15 PROCEDURE — 76642 ULTRASOUND BREAST LIMITED: CPT | Mod: 26,LT,, | Performed by: RADIOLOGY

## 2023-03-15 PROCEDURE — 76642 US BREAST LEFT LIMITED: ICD-10-PCS | Mod: 26,LT,, | Performed by: RADIOLOGY

## 2023-03-27 ENCOUNTER — HOSPITAL ENCOUNTER (EMERGENCY)
Facility: HOSPITAL | Age: 23
Discharge: HOME | End: 2023-03-27
Attending: EMERGENCY MEDICINE
Payer: MEDICAID

## 2023-03-27 VITALS
BODY MASS INDEX: 46.24 KG/M2 | OXYGEN SATURATION: 97 % | RESPIRATION RATE: 16 BRPM | HEART RATE: 104 BPM | TEMPERATURE: 99 F | SYSTOLIC BLOOD PRESSURE: 125 MMHG | WEIGHT: 244.69 LBS | DIASTOLIC BLOOD PRESSURE: 81 MMHG

## 2023-03-27 DIAGNOSIS — U07.1 COVID-19 VIRUS INFECTION: Primary | ICD-10-CM

## 2023-03-27 LAB
CTP QC/QA: YES
SARS-COV-2 RDRP RESP QL NAA+PROBE: POSITIVE

## 2023-03-27 PROCEDURE — 99283 EMERGENCY DEPT VISIT LOW MDM: CPT | Mod: ER

## 2023-03-27 RX ORDER — PROMETHAZINE HYDROCHLORIDE AND DEXTROMETHORPHAN HYDROBROMIDE 6.25; 15 MG/5ML; MG/5ML
5 SYRUP ORAL EVERY 6 HOURS PRN
Qty: 120 ML | Refills: 0 | Status: SHIPPED | OUTPATIENT
Start: 2023-03-27 | End: 2023-04-06

## 2023-03-27 NOTE — Clinical Note
"Nataliya"Twila Ibarra was seen and treated in our emergency department on 3/27/2023.     COVID-19 is present in our communities across the state. There is limited testing for COVID at this time, so not all patients can be tested. In this situation, your employee meets the following criteria:    Nataliya Ibarra has met the criteria for COVID-19 testing and has a POSITIVE result. She can return to work once they are asymptomatic for 24 hours without the use of fever reducing medications AND at least five days from the first positive result. A mask is recommended for 5 days post quarantine.     If you have any questions or concerns, or if I can be of further assistance, please do not hesitate to contact me.    Sincerely,             Mayank Higginbotham MD"

## 2023-03-27 NOTE — ED PROVIDER NOTES
Encounter Date: 3/27/2023       History     Chief Complaint   Patient presents with    COVID-19 Concerns     C/o cough, congestion and Body aches onset Friday. Pt states she had a positive covid home yesterday. Pt aaox4 and in no resp distress.      The history is provided by the patient.   URI  The primary symptoms include sore throat, cough, myalgias and arthralgias. Primary symptoms do not include fever, nausea, vomiting or rash. The current episode started several days ago. This is a new problem.   The sore throat began more than 2 days ago. The sore throat has been unchanged since its onset.   The myalgias are not associated with weakness.   Review of patient's allergies indicates:  No Known Allergies  Past Medical History:   Diagnosis Date    Anxiety     Bipolar disorder     Migraine headache     Obesity     Restless leg syndrome     Scoliosis      Past Surgical History:   Procedure Laterality Date    CYST REMOVAL      TYMPANOSTOMY TUBE PLACEMENT      Age 2     Family History   Problem Relation Age of Onset    Cancer Mother         lymph node    Diabetes Mother         Type 2    Obesity Mother     Melanoma Father     Hyperlipidemia Maternal Grandmother     Osteoporosis Maternal Grandmother     Hyperlipidemia Maternal Grandfather     Hypertension Maternal Grandfather     Kidney disease Maternal Grandfather     Lupus Paternal Grandmother      Social History     Tobacco Use    Smoking status: Never    Smokeless tobacco: Never   Substance Use Topics    Alcohol use: Yes     Comment: occ    Drug use: No     Review of Systems   Constitutional:  Negative for fever.   HENT:  Positive for sore throat.    Respiratory:  Positive for cough. Negative for shortness of breath.    Cardiovascular:  Negative for chest pain.   Gastrointestinal:  Negative for nausea and vomiting.   Genitourinary:  Negative for dysuria.   Musculoskeletal:  Positive for arthralgias and myalgias. Negative for back pain.   Skin:  Negative for rash.    Neurological:  Negative for weakness.   Hematological:  Does not bruise/bleed easily.     Physical Exam     Initial Vitals [03/27/23 1155]   BP Pulse Resp Temp SpO2   125/81 104 16 98.9 °F (37.2 °C) 97 %      MAP       --         Physical Exam    Nursing note and vitals reviewed.  Constitutional: She appears well-developed and well-nourished. No distress.   HENT:   Head: Normocephalic and atraumatic.   Mouth/Throat: Oropharynx is clear and moist.   Eyes: Conjunctivae and EOM are normal. Pupils are equal, round, and reactive to light.   Neck: Neck supple.   Normal range of motion.  Cardiovascular:  Normal rate, regular rhythm and normal heart sounds.     Exam reveals no gallop and no friction rub.       No murmur heard.  Pulmonary/Chest: Breath sounds normal. No respiratory distress. She has no wheezes. She has no rhonchi. She has no rales.   Abdominal: Abdomen is soft. Bowel sounds are normal. She exhibits no distension and no mass. There is no abdominal tenderness. There is no rebound and no guarding.   Musculoskeletal:         General: No tenderness or edema. Normal range of motion.      Cervical back: Normal range of motion and neck supple.     Neurological: She is alert and oriented to person, place, and time. She has normal strength.   Skin: Skin is warm and dry. No rash noted.   Psychiatric: She has a normal mood and affect. Thought content normal.       ED Course   Procedures  Labs Reviewed   SARS-COV-2 RDRP GENE - Abnormal; Notable for the following components:       Result Value    POC Rapid COVID Positive (*)     All other components within normal limits          Imaging Results    None          Medications - No data to display  Medical Decision Making:   Initial Assessment:   Cough, congestion, body aches  Differential Diagnosis:   Viral syndrome, covid infection  Clinical Tests:   Lab Tests: Ordered and Reviewed  ED Management:  COVID +                        Clinical Impression:   Final  diagnoses:  [U07.1] COVID-19 virus infection (Primary)        ED Disposition Condition    Discharge Stable          ED Prescriptions       Medication Sig Dispense Start Date End Date Auth. Provider    promethazine-dextromethorphan (PROMETHAZINE-DM) 6.25-15 mg/5 mL Syrp Take 5 mLs by mouth every 6 (six) hours as needed. 120 mL 3/27/2023 4/6/2023 Mayank Higginbotham MD          Follow-up Information       Follow up With Specialties Details Why Contact Info    Gregory Louis MD Family Medicine   [address] [facility] [address] [facility]  [place] [address]  [phone]               Mayank Higginbotham MD  03/27/23 4260

## 2023-04-02 PROBLEM — N63.20 MASS OF LEFT BREAST: Status: ACTIVE | Noted: 2023-04-02

## 2023-04-02 NOTE — ASSESSMENT & PLAN NOTE
We discussed her left breast mass at length.  I reviewed her films and agree that they are normal.  I feel a lump the size of an almond in the inner left breast that is firm and smooth.  In light of normal imaging - this is most likely a lipoma vs area of fat necrosis.  Both would blend in on imaging.  She was given the options of excisional biopsy vs conservative follow up.  She would like to be seen back in 6 months and reserve surgery for changes.  She is to report any and all changes in the interim.

## 2023-04-02 NOTE — PROGRESS NOTES
[facility]  MD Rhina Marshall, NP-C        Chief Complaint:   Nataliya Ibarra is a 23 y.o. female presenting today for  a new patient appointment to establish breast care. She is due for a discussion of her workup and treatment options  for a left breast lump that she recently noticed.  Imaging with US showed nothing suspicious.  She is also having tenderness in this area.    History of Present Illness:   Mrs. Ibarra presents on April 14, 2023 due to a left breast lump that is increasingly more tender.  She has had no imaging.  MD:::Gregory Louis MD    Past Medical History:   Diagnosis Date    Anxiety     Bipolar disorder     Breast pain, left 4/14/2023    Mass of left breast 4/2/2023    Migraine headache     Obesity     Restless leg syndrome     Scoliosis       Past Surgical History:   Procedure Laterality Date    CYST REMOVAL      TYMPANOSTOMY TUBE PLACEMENT      Age 2        Current Outpatient Medications:     alprazolam (XANAX) 1 MG tablet, Take 1 tablet (1 mg total) by mouth 2 (two) times daily as needed for Anxiety., Disp: 12 tablet, Rfl: 0    fluoxetine (PROZAC) 20 MG capsule, Take 20 mg by mouth once daily., Disp: , Rfl:     norgestrel-ethinyl estradiol (OGESTROL) 0.5-50 mg-mcg per tablet, Take 1 tablet by mouth once daily., Disp: , Rfl:     albuterol (PROVENTIL) 2.5 mg /3 mL (0.083 %) nebulizer solution, Take 3 mLs (2.5 mg total) by nebulization every 6 (six) hours as needed for Wheezing. Or cough, Disp: 25 each, Rfl: 0    azithromycin (Z-HERMES) 250 MG tablet, Take 1 tablet (250 mg total) by mouth once daily. Take 1 daily until finished, Disp: 6 tablet, Rfl: 0    buPROPion (WELLBUTRIN XL) 150 MG TB24 tablet, Take 150 mg by mouth once daily., Disp: , Rfl:     cetirizine (ZYRTEC) 10 MG tablet, Take 10 mg by mouth once daily., Disp: , Rfl:     ibuprofen (ADVIL,MOTRIN) 600 MG tablet, Take 1 tablet (600 mg total) by mouth every 6 (six) hours as  needed for Pain., Disp: 30 tablet, Rfl: 0    ondansetron (ZOFRAN-ODT) 4 MG TbDL, Take 1 tablet (4 mg total) by mouth every 6 (six) hours as needed (nausea and vomiting)., Disp: 15 tablet, Rfl: 0    sumatriptan (IMITREX) 25 MG Tab, Take 1 tab at onset of migraine.  May repeat once in 2 hours., Disp: 20 tablet, Rfl: 0   Review of patient's allergies indicates:  No Known Allergies   Social History     Tobacco Use    Smoking status: Never    Smokeless tobacco: Never   Substance Use Topics    Alcohol use: Yes     Comment: occ      Family History   Problem Relation Age of Onset    Breast cancer Mother     Cancer Mother         lymph node    Diabetes Mother         Type 2    Obesity Mother     Melanoma Father     Hyperlipidemia Maternal Grandmother     Osteoporosis Maternal Grandmother     Hyperlipidemia Maternal Grandfather     Hypertension Maternal Grandfather     Kidney disease Maternal Grandfather     Lupus Paternal Grandmother     Breast cancer Other         Review of Systems   Genitourinary:  Negative for hot flashes.   Integumentary:  Positive for breast mass. Negative for color change, rash, mole/lesion, breast discharge and breast tenderness.   Breast: Positive for mass.Negative for tenderness     Physical Exam   Constitutional: She appears well-developed. She is cooperative.   HENT:   Head: Normocephalic.   Cardiovascular:  Normal rate and regular rhythm.            Pulmonary/Chest: She exhibits no tenderness and no bony tenderness. Right breast exhibits no mass, no nipple discharge, no skin change and no tenderness. Left breast exhibits mass (almond sized) and tenderness. Left breast exhibits no nipple discharge and no skin change.       Abdominal: Soft. Normal appearance.   Musculoskeletal: Lymphadenopathy:      Upper Body:      Right upper body: No supraclavicular or axillary adenopathy.      Left upper body: No supraclavicular or axillary adenopathy.     Neurological: She is alert.   Skin: No rash noted.       ULTRASOUND REPORT: will obtain today    ASSESSMENT and PLAN of CARE    1. Mass of left breast, unspecified quadrant  Assessment & Plan:  We discussed her left breast mass at length.  I reviewed her films and agree that they are normal.  I feel a lump the size of an almond in the inner left breast that is firm and smooth.  In light of normal imaging - this is most likely a lipoma vs area of fat necrosis.  Both would blend in on imaging.  She was given the options of excisional biopsy vs conservative follow up.  She would like to be seen back in 6 months and reserve surgery for changes.  She is to report any and all changes in the interim.        2. Breast pain, left  Assessment & Plan:  We discussed our Fibrocystic Mastopathy Protocol in detail. She should take Vitamin E 800 IU everyday x 3 months or until non-tender then can stop Vitamin E vs. continue daily at 400 IU.  The use of ice packs or warms soaks to tender area of the breast may also be of some benefit.  If warm soaks help her tenderness - She can use Aspercreme (unless allergic to Aspirin) on the affected area.  Ibuprofen (if no contraindications) at 800 mg three times per day for 5 days can also relieve many symptoms associated with swollen or inflamed tissue.  She can repeat Ibuprofen for 5 days, but then should be off for 5 days as it may cause gastric upset.  It is a good idea to wear a tight bra during the day and night to minimize movement of the tender area (Sports Bras work well).  Evening Primrose Oil can be bought over the counter and used at a dose of 3000 mg per day to help with any breast pain/tenderness not improved by implementing the above measures.        Medical Decision Making:  It is my impression that this patient suffers all conditions contained in this medical document.  Each of these conditions did affect our plan of care and my medical decision making today.  It is my opinion that the medical decision making concerning this  patient was of moderate difficulty based on the aforementioned conditions.  Any further recommendations will be communicated to the patient by me.  I have reviewed and verified her allergies, list of medications, medical and surgical histories, social history, and a pertinent review of symptoms.    Follow up:  6 months and prn    For:  PE

## 2023-04-14 ENCOUNTER — OFFICE VISIT (OUTPATIENT)
Dept: SURGERY | Facility: CLINIC | Age: 23
End: 2023-04-14
Payer: MEDICAID

## 2023-04-14 VITALS — WEIGHT: 250 LBS | BODY MASS INDEX: 42.68 KG/M2 | HEIGHT: 64 IN

## 2023-04-14 DIAGNOSIS — N64.4 BREAST PAIN, LEFT: ICD-10-CM

## 2023-04-14 DIAGNOSIS — N63.20 MASS OF LEFT BREAST, UNSPECIFIED QUADRANT: ICD-10-CM

## 2023-04-14 PROCEDURE — 3008F PR BODY MASS INDEX (BMI) DOCUMENTED: ICD-10-PCS | Mod: CPTII,S$GLB,, | Performed by: SPECIALIST

## 2023-04-14 PROCEDURE — 1160F PR REVIEW ALL MEDS BY PRESCRIBER/CLIN PHARMACIST DOCUMENTED: ICD-10-PCS | Mod: CPTII,S$GLB,, | Performed by: SPECIALIST

## 2023-04-14 PROCEDURE — 3008F BODY MASS INDEX DOCD: CPT | Mod: CPTII,S$GLB,, | Performed by: SPECIALIST

## 2023-04-14 PROCEDURE — 1159F PR MEDICATION LIST DOCUMENTED IN MEDICAL RECORD: ICD-10-PCS | Mod: CPTII,S$GLB,, | Performed by: SPECIALIST

## 2023-04-14 PROCEDURE — 99203 OFFICE O/P NEW LOW 30 MIN: CPT | Mod: S$GLB,,, | Performed by: SPECIALIST

## 2023-04-14 PROCEDURE — 99203 PR OFFICE/OUTPT VISIT, NEW, LEVL III, 30-44 MIN: ICD-10-PCS | Mod: S$GLB,,, | Performed by: SPECIALIST

## 2023-04-14 PROCEDURE — 1159F MED LIST DOCD IN RCRD: CPT | Mod: CPTII,S$GLB,, | Performed by: SPECIALIST

## 2023-04-14 PROCEDURE — 1160F RVW MEDS BY RX/DR IN RCRD: CPT | Mod: CPTII,S$GLB,, | Performed by: SPECIALIST

## 2023-04-14 NOTE — ASSESSMENT & PLAN NOTE
We discussed our Fibrocystic Mastopathy Protocol in detail. She should take Vitamin E 800 IU everyday x 3 months or until non-tender then can stop Vitamin E vs. continue daily at 400 IU.  The use of ice packs or warms soaks to tender area of the breast may also be of some benefit.  If warm soaks help her tenderness - She can use Aspercreme (unless allergic to Aspirin) on the affected area.  Ibuprofen (if no contraindications) at 800 mg three times per day for 5 days can also relieve many symptoms associated with swollen or inflamed tissue.  She can repeat Ibuprofen for 5 days, but then should be off for 5 days as it may cause gastric upset.  It is a good idea to wear a tight bra during the day and night to minimize movement of the tender area (Sports Bras work well).  Evening Primrose Oil can be bought over the counter and used at a dose of 3000 mg per day to help with any breast pain/tenderness not improved by implementing the above measures.

## 2023-05-07 ENCOUNTER — HOSPITAL ENCOUNTER (EMERGENCY)
Facility: HOSPITAL | Age: 23
Discharge: HOME | End: 2023-05-07
Attending: EMERGENCY MEDICINE
Payer: MEDICAID

## 2023-05-07 VITALS
BODY MASS INDEX: 42.76 KG/M2 | OXYGEN SATURATION: 96 % | WEIGHT: 249.13 LBS | DIASTOLIC BLOOD PRESSURE: 97 MMHG | TEMPERATURE: 98 F | SYSTOLIC BLOOD PRESSURE: 138 MMHG | HEART RATE: 78 BPM | RESPIRATION RATE: 16 BRPM

## 2023-05-07 DIAGNOSIS — S06.0X0A CONCUSSION WITHOUT LOSS OF CONSCIOUSNESS, INITIAL ENCOUNTER: Primary | ICD-10-CM

## 2023-05-07 DIAGNOSIS — R51.9 HEADACHE: ICD-10-CM

## 2023-05-07 DIAGNOSIS — J30.9 ALLERGIC RHINITIS, UNSPECIFIED SEASONALITY, UNSPECIFIED TRIGGER: ICD-10-CM

## 2023-05-07 PROCEDURE — 99284 EMERGENCY DEPT VISIT MOD MDM: CPT | Mod: 25,ER

## 2023-05-07 RX ORDER — FLUTICASONE PROPIONATE 50 MCG
2 SPRAY, SUSPENSION (ML) NASAL 2 TIMES DAILY PRN
Qty: 15 G | Refills: 0 | Status: SHIPPED | OUTPATIENT
Start: 2023-05-07 | End: 2023-06-19

## 2023-05-07 RX ORDER — CETIRIZINE HYDROCHLORIDE 10 MG/1
10 TABLET ORAL DAILY
Qty: 30 TABLET | Refills: 0 | Status: SHIPPED | OUTPATIENT
Start: 2023-05-07 | End: 2023-06-19

## 2023-05-07 NOTE — ED PROVIDER NOTES
Emergency Medicine Provider Note - 5/7/2023        History     Chief Complaint   Patient presents with    Head Injury     Hit head on metal drink machine Thursday, right eyebrow, reports headache since          History of Present Illness   HPI    5/7/2023, 12:06 PM  The history is provided by the patient    Nataliya Ibarra is a 23 y.o. female presenting to the ED for headache.  Patient reports that she hit her head on a metal drinking machine 3 days prior.  Since then she is had headache.  The headache has been located to the right frontal area.  Patient reports that she saw stars at the time.  No loss of consciousness...    Patient admits to nasal congestion, rhinorrhea, sneezing.    Patient was seen at a urgent care prior to arrival.  She was referred here to the emergency department for a CT of her head.      Arrival mode:  Personal Vehicle      PCP: Gregory Louis MD     Allergies:  Review of patient's allergies indicates:  No Known Allergies    Past Medical History:  Past Medical History:   Diagnosis Date    Anxiety     Bipolar disorder     Breast pain, left 4/14/2023    Mass of left breast 4/2/2023    Migraine headache     Obesity     Restless leg syndrome     Scoliosis        Past Surgical History:  Past Surgical History:   Procedure Laterality Date    CYST REMOVAL      TYMPANOSTOMY TUBE PLACEMENT      Age 2         Family History:  Family History   Problem Relation Age of Onset    Breast cancer Mother     Cancer Mother         lymph node    Diabetes Mother         Type 2    Obesity Mother     Melanoma Father     Hyperlipidemia Maternal Grandmother     Osteoporosis Maternal Grandmother     Hyperlipidemia Maternal Grandfather     Hypertension Maternal Grandfather     Kidney disease Maternal Grandfather     Lupus Paternal Grandmother     Breast cancer Other        Social History:  Social History     Tobacco Use    Smoking status: Never    Smokeless tobacco: Never   Substance and Sexual Activity     Alcohol use: Yes     Comment: occ    Drug use: No    Sexual activity: Never       Triage note, Allergies, Past Medical History, Past Surgical History, Family History and Social History reviewed as documented above.     Review of Systems   Review of Systems   Constitutional:  Negative for fever.   HENT:  Positive for postnasal drip, rhinorrhea and sneezing. Negative for ear discharge, ear pain and sore throat.    Respiratory:  Negative for shortness of breath.    Cardiovascular:  Negative for chest pain.   Gastrointestinal:  Negative for nausea and vomiting.   Genitourinary:  Negative for dysuria.   Musculoskeletal:  Negative for back pain.   Skin:  Negative for rash.   Neurological:  Positive for headaches. Negative for weakness and light-headedness.        (-) LOC   Hematological:  Does not bruise/bleed easily.        Physical Exam     Initial Vitals [05/07/23 1109]   BP Pulse Resp Temp SpO2   (!) 138/97 78 16 98.1 °F (36.7 °C) 96 %      MAP       --          Physical Exam    Nursing Notes and Vital Signs Reviewed.  Constitutional: Patient is in . Well-developed and well-nourished.  Head: Atraumatic. Normocephalic.  Eyes: PERRL. EOM intact. Conjunctivae are not pale. No scleral icterus.  ENT: Mucous membranes are moist. Oropharynx is clear and symmetric.  TMs pearly gray bilaterally.  There is hyperemia of the nasal mucosa.  Clear nasal drainage.  Neck: Supple. Full ROM. No lymphadenopathy.  No midline C-spine tenderness.  Cardiovascular: Regular rate. Regular rhythm. No murmurs, rubs, or gallops. Distal pulses are 2+ and symmetric.  Pulmonary/Chest: No respiratory distress. Clear to auscultation bilaterally. No wheezing or rales.  Musculoskeletal: Moves all extremities. No obvious deformities. No edema. No calf tenderness.  Skin: Warm and dry.  Neurological:  Alert, awake, and appropriate.  Normal speech.  No acute focal neurological deficits are appreciated.  GCS 15.  Cranial nerves 2-12 intact.  Patient  ambulates without difficulty.  Psychiatric: Normal affect. Good eye contact. Appropriate in content.     ED Course     ED Procedures:  Procedures    ED Vital Signs:  Vitals:    05/07/23 1109   BP: (!) 138/97   Pulse: 78   Resp: 16   Temp: 98.1 °F (36.7 °C)   TempSrc: Oral   SpO2: 96%   Weight: 113 kg (249 lb 1.9 oz)       Abnormal Lab Results:  Labs Reviewed - No data to display     All Lab Results:  None        Imaging Results:  Imaging Results              CT Head Without Contrast (Final result)  Result time 05/07/23 11:35:52      Final result by Luis Vilchis MD (05/07/23 11:35:52)                   Impression:      No acute intracranial abnormality.      Electronically signed by: Luis Vilchis  Date:    05/07/2023  Time:    11:35               Narrative:    EXAMINATION:  CT HEAD WITHOUT CONTRAST    CLINICAL HISTORY:  Facial trauma, blunt; Headache, unspecified    TECHNIQUE:  Low dose axial images were obtained through the head.  Coronal and sagittal reformations were also performed. Contrast was not administered.    All CT scans at this location are performed using dose optimization techniques including the following: Automated exposure control; adjustment of the mA and/or kv; use of iterative reconstruction technique.    COMPARISON:  None.    FINDINGS:  Cerebral and ventricular volumes are within normal limits for no evidence of hydrocephalus.    No evidence of acute territorial infarct, intracranial hemorrhage, or midline shift.  No midline shift or mass effect.    Midline structures and posterior fossa structures are unremarkable.  Basal cisterns are clear.  No significant arterial calcifications seen.    Calvarium is intact without acute or aggressive abnormality.  Orbits and globes appear within normal limits.  Visualized paranasal sinuses and mastoid air cells are clear.                                            The Emergency Provider reviewed the vital signs and test results, which are  "outlined above.     ED Discussion             12:30  Reassessment: Dr. Llanos reassessed the pt.  The pt is resting comfortably and is NAD.  Pt states their sx have improved. Discussed test results, shared treatment plan, specific conditions for return, and the need for f/u.  Answered their questions at this time.  Pt understands and agrees to the plan.  The pt has remained hemodynamically stable through ED course and is stable for discharge.      I discussed with patient and/or family/caretaker that evaluation in the ED does not suggest any emergent or life threatening medical conditions requiring immediate intervention beyond what was provided in the ED, and I believe patient is safe for discharge.  Regardless, an unremarkable evaluation in the ED does not preclude the development or presence of a serious of life threatening condition. As such, patient was instructed to return immediately for any worsening or change in current symptoms.      ED Medication(s):  Medications - No data to display      Medical Decision Making   Medical Decision Making  Hit head.  No loss of consciousness.  No anticoagulant use.  Persistent headache.  Complaints of sneezing, runny nose    Differential diagnosis includes:  Concussion, allergic sinusitis, stress headache,    Amount and/or Complexity of Data Reviewed  Radiology: ordered.     Details: Radiology report reviewed.          Discussed case with:N/A       MIPS Measures     Smoker? No     Hypertension: Pre-hypertension/Hypertension: The pt has been informed that they may have pre-hypertension or hypertension based on a blood pressure reading in the ED. I recommend that the pt call the PCP listed on their discharge instructions or a physician of their choice this week to arrange f/u for further evaluation of possible pre-hypertension or hypertension.       Portions of this note may have been created with voice recognition software. Occasional "wrong-word" or "sound-a-like" " substitutions may have occurred due to the inherent limitations of voice recognition software. Please, read the note carefully and recognize, using context, where substitutions have occurred.            Clinical Impression       ICD-10-CM ICD-9-CM   1. Concussion without loss of consciousness, initial encounter  S06.0X0A 850.0   2. Headache  R51.9 784.0   3. Allergic rhinitis, unspecified seasonality, unspecified trigger  J30.9 477.9         ED Disposition       Disposition: Discharge to home  Patient condition: Good    Medication List     Medication List        START taking these medications      fluticasone propionate 50 mcg/actuation nasal spray  Commonly known as: FLONASE  2 sprays (100 mcg total) by Each Nostril route 2 (two) times daily as needed for Rhinitis.            CONTINUE taking these medications      cetirizine 10 MG tablet  Commonly known as: ZYRTEC  Take 1 tablet (10 mg total) by mouth once daily.            ASK your doctor about these medications      albuterol 2.5 mg /3 mL (0.083 %) nebulizer solution  Commonly known as: PROVENTIL  Take 3 mLs (2.5 mg total) by nebulization every 6 (six) hours as needed for Wheezing. Or cough     ALPRAZolam 1 MG tablet  Commonly known as: XANAX  Take 1 tablet (1 mg total) by mouth 2 (two) times daily as needed for Anxiety.     azithromycin 250 MG tablet  Commonly known as: Z-HERMES  Take 1 tablet (250 mg total) by mouth once daily. Take 1 daily until finished     buPROPion 150 MG TB24 tablet  Commonly known as: WELLBUTRIN XL     FLUoxetine 20 MG capsule     ibuprofen 600 MG tablet  Commonly known as: ADVIL,MOTRIN  Take 1 tablet (600 mg total) by mouth every 6 (six) hours as needed for Pain.     norgestrel-ethinyl estradioL 0.5-50 mg-mcg per tablet  Commonly known as: OGESTROL     ondansetron 4 MG Tbdl  Commonly known as: ZOFRAN-ODT  Take 1 tablet (4 mg total) by mouth every 6 (six) hours as needed (nausea and vomiting).     sumatriptan 25 MG Tab  Commonly known as:  IMITREX  Take 1 tab at onset of migraine.  May repeat once in 2 hours.               Where to Get Your Medications        You can get these medications from any pharmacy    Bring a paper prescription for each of these medications  cetirizine 10 MG tablet  fluticasone propionate 50 mcg/actuation nasal spray         ED Follow-up   Follow-up Information       Gregory Louis MD In 2 days.    Specialty: Family Medicine  Why: Return to emergency department for severe pain, vomiting, numbness or weakness to 1 side, loss of vision, high fever, or other concerns.  Contact information:  [address] [facility] [address]  [phone]                                      Elena Llanos,   05/07/23 3165

## 2023-05-07 NOTE — Clinical Note
"Nataliya Mclaughlinth" Fred was seen and treated in our emergency department on 5/7/2023.  She may return to work on 05/09/2023.       If you have any questions or concerns, please don't hesitate to call.      Elena Llanos, DO"

## 2023-05-22 PROBLEM — N63.25 BREAST LUMP ON LEFT SIDE AT 9 O'CLOCK POSITION: Status: ACTIVE | Noted: 2023-04-02

## 2023-05-22 NOTE — ASSESSMENT & PLAN NOTE
We discussed how this mass is most likely benign, however, it is causing her increased worry and anxiety.  She would like to have it removed.  We discussed the R/B/I and alternatives at length.  PLAN:::LEFT BREAST EXCISIONAL BIOPSY.  I will phone her with the pathology report once received.

## 2023-05-22 NOTE — PROGRESS NOTES
[facility]  MD Rhina Marshall, NP-C        Chief Complaint:   Nataliya Ibarra is a 23 y.o. female presenting today to discuss surgical removal of her left breast mass.    History of Present Illness:   Mrs. Ibarra presented on April 14, 2023 due to a left breast lump that has been increasingly more tender.  Imaging was normal in this area.  MD:::Gregory Louis MD    Past Medical History:   Diagnosis Date    Anxiety     Bipolar disorder     Breast pain, left 4/14/2023    Mass of left breast 4/2/2023    Migraine headache     Obesity     Restless leg syndrome     Scoliosis       Past Surgical History:   Procedure Laterality Date    CYST REMOVAL      TYMPANOSTOMY TUBE PLACEMENT      Age 2        Current Outpatient Medications:     albuterol (PROVENTIL) 2.5 mg /3 mL (0.083 %) nebulizer solution, Take 3 mLs (2.5 mg total) by nebulization every 6 (six) hours as needed for Wheezing. Or cough, Disp: 25 each, Rfl: 0    alprazolam (XANAX) 1 MG tablet, Take 1 tablet (1 mg total) by mouth 2 (two) times daily as needed for Anxiety., Disp: 12 tablet, Rfl: 0    azithromycin (Z-HERMES) 250 MG tablet, Take 1 tablet (250 mg total) by mouth once daily. Take 1 daily until finished, Disp: 6 tablet, Rfl: 0    buPROPion (WELLBUTRIN XL) 150 MG TB24 tablet, Take 150 mg by mouth once daily., Disp: , Rfl:     cetirizine (ZYRTEC) 10 MG tablet, Take 1 tablet (10 mg total) by mouth once daily., Disp: 30 tablet, Rfl: 0    fluoxetine (PROZAC) 20 MG capsule, Take 20 mg by mouth once daily., Disp: , Rfl:     fluticasone propionate (FLONASE) 50 mcg/actuation nasal spray, 2 sprays (100 mcg total) by Each Nostril route 2 (two) times daily as needed for Rhinitis., Disp: 15 g, Rfl: 0    ibuprofen (ADVIL,MOTRIN) 600 MG tablet, Take 1 tablet (600 mg total) by mouth every 6 (six) hours as needed for Pain., Disp: 30 tablet, Rfl: 0    norgestrel-ethinyl estradiol (OGESTROL) 0.5-50 mg-mcg per tablet,  Take 1 tablet by mouth once daily., Disp: , Rfl:     ondansetron (ZOFRAN-ODT) 4 MG TbDL, Take 1 tablet (4 mg total) by mouth every 6 (six) hours as needed (nausea and vomiting)., Disp: 15 tablet, Rfl: 0    sumatriptan (IMITREX) 25 MG Tab, Take 1 tab at onset of migraine.  May repeat once in 2 hours., Disp: 20 tablet, Rfl: 0   Review of patient's allergies indicates:  No Known Allergies   Social History     Tobacco Use    Smoking status: Never    Smokeless tobacco: Never   Substance Use Topics    Alcohol use: Yes     Comment: occ      Family History   Problem Relation Age of Onset    Breast cancer Mother     Cancer Mother         lymph node    Diabetes Mother         Type 2    Obesity Mother     Melanoma Father     Hyperlipidemia Maternal Grandmother     Osteoporosis Maternal Grandmother     Hyperlipidemia Maternal Grandfather     Hypertension Maternal Grandfather     Kidney disease Maternal Grandfather     Lupus Paternal Grandmother     Breast cancer Other         Review of Systems   Genitourinary:  Negative for hot flashes.   Integumentary:  Positive for breast mass. Negative for color change, rash, mole/lesion, breast discharge and breast tenderness.   Breast: Positive for mass.Negative for tenderness     Physical Exam   Constitutional: She appears well-developed. She is cooperative.   HENT:   Head: Normocephalic.   Cardiovascular:  Normal rate and regular rhythm.            Pulmonary/Chest: She exhibits no tenderness and no bony tenderness. Right breast exhibits no mass, no nipple discharge, no skin change and no tenderness. Left breast exhibits mass (almond sized) and tenderness. Left breast exhibits no nipple discharge and no skin change.       Abdominal: Soft. Normal appearance.   Musculoskeletal: Lymphadenopathy:      Upper Body:      Right upper body: No supraclavicular or axillary adenopathy.      Left upper body: No supraclavicular or axillary adenopathy.     Neurological: She is alert.   Skin: No rash  noted.      ULTRASOUND REPORT: no evidence of a suspicious mass    ASSESSMENT and PLAN of CARE    1. Breast lump on left side at 9 o'clock position  Assessment & Plan:  We discussed how this mass is most likely benign, however, it is causing her increased worry and anxiety.  She would like to have it removed.  We discussed the R/B/I and alternatives at length.  PLAN:::LEFT BREAST EXCISIONAL BIOPSY.  I will phone her with the pathology report once received.      Medical Decision Making:  It is my impression that this patient suffers all conditions contained in this medical document.  Each of these conditions did affect our plan of care and my medical decision making today.  It is my opinion that the medical decision making concerning this patient was of minimal difficulty based on the aforementioned conditions.  Any further recommendations will be communicated to the patient by me.  I have reviewed and verified her allergies, list of medications, medical and surgical histories, social history, and a pertinent review of symptoms.    Follow up:  1-2 weeks and prn for Post Op

## 2023-06-05 ENCOUNTER — OFFICE VISIT (OUTPATIENT)
Dept: SURGERY | Facility: CLINIC | Age: 23
End: 2023-06-05
Payer: MEDICAID

## 2023-06-05 DIAGNOSIS — N63.25 BREAST LUMP ON LEFT SIDE AT 9 O'CLOCK POSITION: ICD-10-CM

## 2023-06-05 PROCEDURE — 1159F MED LIST DOCD IN RCRD: CPT | Mod: CPTII,S$GLB,, | Performed by: SPECIALIST

## 2023-06-05 PROCEDURE — 1159F PR MEDICATION LIST DOCUMENTED IN MEDICAL RECORD: ICD-10-PCS | Mod: CPTII,S$GLB,, | Performed by: SPECIALIST

## 2023-06-05 PROCEDURE — 99213 PR OFFICE/OUTPT VISIT, EST, LEVL III, 20-29 MIN: ICD-10-PCS | Mod: S$GLB,,, | Performed by: SPECIALIST

## 2023-06-05 PROCEDURE — 99213 OFFICE O/P EST LOW 20 MIN: CPT | Mod: S$GLB,,, | Performed by: SPECIALIST

## 2023-06-05 PROCEDURE — 1160F PR REVIEW ALL MEDS BY PRESCRIBER/CLIN PHARMACIST DOCUMENTED: ICD-10-PCS | Mod: CPTII,S$GLB,, | Performed by: SPECIALIST

## 2023-06-05 PROCEDURE — 1160F RVW MEDS BY RX/DR IN RCRD: CPT | Mod: CPTII,S$GLB,, | Performed by: SPECIALIST

## 2023-06-19 ENCOUNTER — LAB VISIT (OUTPATIENT)
Dept: LAB | Facility: HOSPITAL | Age: 23
End: 2023-06-19
Attending: NURSE PRACTITIONER
Payer: MEDICAID

## 2023-06-19 ENCOUNTER — OFFICE VISIT (OUTPATIENT)
Dept: OBSTETRICS AND GYNECOLOGY | Facility: CLINIC | Age: 23
End: 2023-06-19
Payer: MEDICAID

## 2023-06-19 VITALS — HEIGHT: 64 IN | WEIGHT: 258.81 LBS | BODY MASS INDEX: 44.18 KG/M2

## 2023-06-19 DIAGNOSIS — Z11.3 SCREENING FOR STD (SEXUALLY TRANSMITTED DISEASE): ICD-10-CM

## 2023-06-19 DIAGNOSIS — I86.3 VARICOSE VEINS OF VULVA AND PERINEUM: ICD-10-CM

## 2023-06-19 DIAGNOSIS — Z30.41 ENCOUNTER FOR SURVEILLANCE OF CONTRACEPTIVE PILLS: ICD-10-CM

## 2023-06-19 DIAGNOSIS — Z01.419 ENCOUNTER FOR GYNECOLOGICAL EXAMINATION WITHOUT ABNORMAL FINDING: Primary | ICD-10-CM

## 2023-06-19 LAB
HAV IGM SERPL QL IA: NORMAL
HBV CORE IGM SERPL QL IA: NORMAL
HBV SURFACE AG SERPL QL IA: NORMAL
HCV AB SERPL QL IA: NORMAL
HIV 1+2 AB+HIV1 P24 AG SERPL QL IA: NORMAL

## 2023-06-19 PROCEDURE — 3008F PR BODY MASS INDEX (BMI) DOCUMENTED: ICD-10-PCS | Mod: CPTII,,, | Performed by: NURSE PRACTITIONER

## 2023-06-19 PROCEDURE — 99999 PR PBB SHADOW E&M-EST. PATIENT-LVL III: CPT | Mod: PBBFAC,,, | Performed by: NURSE PRACTITIONER

## 2023-06-19 PROCEDURE — 1160F PR REVIEW ALL MEDS BY PRESCRIBER/CLIN PHARMACIST DOCUMENTED: ICD-10-PCS | Mod: CPTII,,, | Performed by: NURSE PRACTITIONER

## 2023-06-19 PROCEDURE — 3008F BODY MASS INDEX DOCD: CPT | Mod: CPTII,,, | Performed by: NURSE PRACTITIONER

## 2023-06-19 PROCEDURE — 99213 OFFICE O/P EST LOW 20 MIN: CPT | Mod: PBBFAC | Performed by: NURSE PRACTITIONER

## 2023-06-19 PROCEDURE — 99999 PR PBB SHADOW E&M-EST. PATIENT-LVL III: ICD-10-PCS | Mod: PBBFAC,,, | Performed by: NURSE PRACTITIONER

## 2023-06-19 PROCEDURE — 80074 ACUTE HEPATITIS PANEL: CPT | Performed by: NURSE PRACTITIONER

## 2023-06-19 PROCEDURE — 86592 SYPHILIS TEST NON-TREP QUAL: CPT | Performed by: NURSE PRACTITIONER

## 2023-06-19 PROCEDURE — 1159F PR MEDICATION LIST DOCUMENTED IN MEDICAL RECORD: ICD-10-PCS | Mod: CPTII,,, | Performed by: NURSE PRACTITIONER

## 2023-06-19 PROCEDURE — 88175 CYTOPATH C/V AUTO FLUID REDO: CPT | Performed by: NURSE PRACTITIONER

## 2023-06-19 PROCEDURE — 99385 PREV VISIT NEW AGE 18-39: CPT | Mod: S$PBB,,, | Performed by: NURSE PRACTITIONER

## 2023-06-19 PROCEDURE — 99385 PR PREVENTIVE VISIT,NEW,18-39: ICD-10-PCS | Mod: S$PBB,,, | Performed by: NURSE PRACTITIONER

## 2023-06-19 PROCEDURE — 1160F RVW MEDS BY RX/DR IN RCRD: CPT | Mod: CPTII,,, | Performed by: NURSE PRACTITIONER

## 2023-06-19 PROCEDURE — 36415 COLL VENOUS BLD VENIPUNCTURE: CPT | Performed by: NURSE PRACTITIONER

## 2023-06-19 PROCEDURE — 1159F MED LIST DOCD IN RCRD: CPT | Mod: CPTII,,, | Performed by: NURSE PRACTITIONER

## 2023-06-19 PROCEDURE — 87591 N.GONORRHOEAE DNA AMP PROB: CPT | Performed by: NURSE PRACTITIONER

## 2023-06-19 PROCEDURE — 87389 HIV-1 AG W/HIV-1&-2 AB AG IA: CPT | Performed by: NURSE PRACTITIONER

## 2023-06-19 RX ORDER — NORGESTIMATE AND ETHINYL ESTRADIOL 7DAYSX3 28
1 KIT ORAL NIGHTLY
COMMUNITY
Start: 2023-06-01 | End: 2023-06-19 | Stop reason: SDUPTHER

## 2023-06-19 RX ORDER — NORGESTIMATE AND ETHINYL ESTRADIOL 7DAYSX3 28
1 KIT ORAL NIGHTLY
Qty: 84 TABLET | Refills: 3 | Status: SHIPPED | OUTPATIENT
Start: 2023-06-19 | End: 2024-02-12 | Stop reason: SDUPTHER

## 2023-06-19 NOTE — PROGRESS NOTES
"CC: Well woman exam    Nataliya Ibarra is a 23 y.o. female No obstetric history on file. presents for well woman exam.  LMP: Patient's last menstrual period was 06/01/2023..    New pt   On ocp since age 13 to regulate cycle  Last pap 2-3 yrs ago - has been normal  Nodules to vagina for years - had [facility] but was not able to make it     Past Medical History:   Diagnosis Date    Anxiety     Bipolar disorder     Breast pain, left 4/14/2023    Mass of left breast 4/2/2023    Migraine headache     Obesity     Restless leg syndrome     Scoliosis      Past Surgical History:   Procedure Laterality Date    CYST REMOVAL      TYMPANOSTOMY TUBE PLACEMENT      Age 2     Social History     Socioeconomic History    Marital status: Single   Tobacco Use    Smoking status: Never    Smokeless tobacco: Never   Substance and Sexual Activity    Alcohol use: Yes     Comment: occ    Drug use: No    Sexual activity: Never   Social History Narrative    Live with Mom, and Mom's Boyfriend, Parents Not Together, + Smokers, +1 Dog.    Dad in [place] care in [place], has cancer (melanoma, metastatic).     In the 10th Grade, Homeschooled.    Moved from [place], in [place] in October 2012.    Lives in [facility].     Family History   Problem Relation Age of Onset    Breast cancer Mother     Cancer Mother         lymph node    Diabetes Mother         Type 2    Obesity Mother     Melanoma Father     Hyperlipidemia Maternal Grandmother     Osteoporosis Maternal Grandmother     Hyperlipidemia Maternal Grandfather     Hypertension Maternal Grandfather     Kidney disease Maternal Grandfather     Lupus Paternal Grandmother     Breast cancer Other      OB History    No obstetric history on file.         Ht 5' 4" (1.626 m)   Wt 117.4 kg (258 lb 13.1 oz)   LMP 06/01/2023   BMI 44.43 kg/m²       ROS:  Per hpi    PHYSICAL EXAM:  APPEARANCE: Well nourished, well developed, in no acute distress.  AFFECT: WNL, alert and oriented x 3  SKIN: No acne or hirsutism  NECK: " Neck symmetric without masses or thyromegaly  NODES: No inguinal, cervical, axillary, or femoral lymph node enlargement  CHEST: Good respiratory effect  ABDOMEN: Soft.  No tenderness or masses.  No hepatosplenomegaly.  No hernias.  BREASTS: Symmetrical, no skin changes or visible lesions.  No palpable masses, nipple discharge bilaterally.  PELVIC: Normal external genitalia without lesions, appears to have varicosities at lower vulva just above perineum.   Normal hair distribution.  Adequate perineal body, normal urethral meatus.  Vagina moist and well rugated without lesions or discharge.  Cervix pink, without lesions, discharge or tenderness.  No significant cystocele or rectocele.  Bimanual exam shows uterus to be normal size, regular, mobile and nontender.  Adnexa without masses or tenderness.    EXTREMITIES: No edema.  Physical Exam:               Genitourinary:                         1. Encounter for gynecological examination without abnormal finding  Liquid-Based Pap Smear, Screening      2. Screening for STD (sexually transmitted disease)  C. trachomatis/N. gonorrhoeae by AMP DNA    HIV 1/2 Ag/Ab (4th Gen)    Hepatitis Panel, Acute    RPR      3. Encounter for surveillance of contraceptive pills  TRI-SPRINTEC, 28, 0.18/0.215/0.25 mg-35 mcg (28) tablet      4. Varicose veins of vulva and perineum         AND PLAN:    Nataliya was seen today for well woman.    Diagnoses and all orders for this visit:    Encounter for gynecological examination without abnormal finding  -     Liquid-Based Pap Smear, Screening    Screening for STD (sexually transmitted disease)  -     C. trachomatis/N. gonorrhoeae by AMP DNA  -     HIV 1/2 Ag/Ab (4th Gen); Future  -     Hepatitis Panel, Acute; Future  -     RPR; Future    Encounter for surveillance of contraceptive pills  -     TRI-SPRINTEC, 28, 0.18/0.215/0.25 mg-35 mcg (28) tablet; Take 1 tablet by mouth every evening.    Varicose veins of vulva and perineum     High fiber  diet, water, try to avoid constipation and sitting on toilet for varicosities    Patient was counseled today on A.C.S. Pap guidelines and recommendations for yearly pelvic exams, mammograms and monthly self breast exams; to see her PCP for other health maintenance.                    Answers submitted by the patient for this visit:  Gynecologic Exam Questionnaire  (Submitted on 2023)  Chief Complaint: Gynecologic exam  genital itching: No  genital lesions: No  genital odor: No  genital rash: No  missed menses: No  pelvic pain: No  vaginal bleeding: No  vaginal discharge: No  Chronicity: recurrent  Onset: more than 1 year ago  Frequency: constantly  Progression since onset: gradually worsening  Pain severity: mild  Affected side: both  Pregnant now?: No  abdominal pain: No  anorexia: No  back pain: No  chills: No  constipation: No  diarrhea: No  discolored urine: No  dysuria: No  fever: No  flank pain: No  frequency: No  headaches: No  hematuria: No  nausea: No  painful intercourse: Yes  rash: No  urgency: No  vomiting: No  Please select the characteristics of your discharge: : normal  Vaginal bleeding: no bleeding  Passing clots?: No  Passing tissue?: No  Aggravated by: intercourse  treatments tried: acetaminophen, NSAIDs  Improvement on treatment: no relief  Sexual activity: sexually active  Partner with STD symptoms: no  Birth control: oral contraceptives  Menstrual history: regular  STD: No  abdominal surgery: No   section: No  Ectopic pregnancy: No  Endometriosis: No  herpes simplex: No  gynecological surgery: No  menorrhagia: No  metrorrhagia: No  miscarriage: No  ovarian cysts: No  perineal abscess: No  PID: No  vaginosis: No

## 2023-06-20 ENCOUNTER — OUTSIDE PLACE OF SERVICE (OUTPATIENT)
Dept: SURGERY | Facility: CLINIC | Age: 23
End: 2023-06-20
Payer: MEDICAID

## 2023-06-20 LAB
C TRACH DNA SPEC QL NAA+PROBE: NOT DETECTED
N GONORRHOEA DNA SPEC QL NAA+PROBE: NOT DETECTED
RPR SER QL: NORMAL

## 2023-06-20 PROCEDURE — 19120 REMOVAL OF BREAST LESION: CPT | Mod: LT,,, | Performed by: SPECIALIST

## 2023-06-20 PROCEDURE — 19120 PR EXCISE BREAST CYST: ICD-10-PCS | Mod: LT,,, | Performed by: SPECIALIST

## 2023-06-22 LAB
FINAL PATHOLOGIC DIAGNOSIS: NORMAL
Lab: NORMAL

## 2023-06-25 NOTE — PROGRESS NOTES
[facility]  Srinivasan Alejo MD, FACS  Rhina Lr NP-C      Chief Complaint:   Nataliya Ibarra is a 23 y.o. female presenting today for a post operative visit.  She is status post left breast excisional biopsy  She has done well post operatively and has no complaints.      History of Present Illness:   Ms. Ibarra first presented on April 14, 2023 due to a left breast lump that increasingly became more tender.  Imaging was normal in this area.  Excision of this area revealed mature adipose tissue compatible with a benign lipoma. MD:::Gregory Louis MD    Past Medical History:   Diagnosis Date    Anxiety     Bipolar disorder     Breast pain, left 4/14/2023    Mass of left breast 4/2/2023    Migraine headache     Obesity     Restless leg syndrome     Scoliosis       Past Surgical History:   Procedure Laterality Date    CYST REMOVAL      TYMPANOSTOMY TUBE PLACEMENT      Age 2        Current Outpatient Medications:     alprazolam (XANAX) 1 MG tablet, Take 1 tablet (1 mg total) by mouth 2 (two) times daily as needed for Anxiety., Disp: 12 tablet, Rfl: 0    TRI-SPRINTEC, 28, 0.18/0.215/0.25 mg-35 mcg (28) tablet, Take 1 tablet by mouth every evening., Disp: 84 tablet, Rfl: 3   Review of patient's allergies indicates:  No Known Allergies   Social History     Tobacco Use    Smoking status: Never    Smokeless tobacco: Never   Substance Use Topics    Alcohol use: Yes     Comment: occ      Family History   Problem Relation Age of Onset    Breast cancer Mother     Cancer Mother         lymph node    Diabetes Mother         Type 2    Obesity Mother     Melanoma Father     Hyperlipidemia Maternal Grandmother     Osteoporosis Maternal Grandmother     Hyperlipidemia Maternal Grandfather     Hypertension Maternal Grandfather     Kidney disease Maternal Grandfather     Lupus Paternal Grandmother     Breast cancer Other         Review of Systems   Constitutional:  Positive for  activity change (slowed down after surgery). Negative for fever.   Respiratory:  Negative for shortness of breath.    Cardiovascular:  Negative for chest pain.   Integumentary:  Positive for breast tenderness (typical post operative). Negative for rash and wound.   Breast: Positive for tenderness (typical post operative).     Physical Exam   Pulmonary/Chest: Effort normal. She exhibits tenderness. She exhibits no swelling.   Right- Deferred. Left- Normal post op changes.   Abdominal: Normal appearance.   Skin: No rash noted.          ASSESSMENT and PLAN OF CARE     1. Breast lump on left side at 9 o'clock position  Assessment & Plan:   She is doing great post operative and will alert me to any issues. Pathology reports were discussed in detail. All questions were answered and recommendations were made for future treatments/follow-up. She understands the importance of monthly self-breast exams and knows to notify me of any and all changes as they occu        2. Breast pain, left  Assessment & Plan:  We discussed our fibrocystic mastopathy protocol in detail. She knows that if she follows this protocol - that her symptoms should improve.  We discussed how breast pain is usually not associated with breast cancer, however, pain can be the presenting symptom with some cancers (but this could be coincidental). Still, if her pain does not improve in 8-12 weeks she should call us back for additional recommendations.'       Medical Decision Making:  It is my impression that this patient suffers all conditions contained in this medical document.  Each of these conditions did affect our plan of care and my medical decision making today.  It is my opinion that the medical decision making concerning this patient was of minimal  difficulty based on the aforementioned conditions.  Any further recommendations will be communicated to the patient by me.  I have reviewed and verified her allergies, list of medications, medical and  surgical histories, social history, and a pertinent review of symptoms.     Follow up: 6 months and prn    For: PE

## 2023-07-03 ENCOUNTER — OFFICE VISIT (OUTPATIENT)
Dept: SURGERY | Facility: CLINIC | Age: 23
End: 2023-07-03
Payer: MEDICAID

## 2023-07-03 DIAGNOSIS — N63.25 BREAST LUMP ON LEFT SIDE AT 9 O'CLOCK POSITION: Primary | ICD-10-CM

## 2023-07-03 DIAGNOSIS — N64.4 BREAST PAIN, LEFT: ICD-10-CM

## 2023-07-03 PROCEDURE — 99024 PR POST-OP FOLLOW-UP VISIT: ICD-10-PCS | Mod: S$GLB,,, | Performed by: NURSE PRACTITIONER

## 2023-07-03 PROCEDURE — 1159F PR MEDICATION LIST DOCUMENTED IN MEDICAL RECORD: ICD-10-PCS | Mod: CPTII,S$GLB,, | Performed by: NURSE PRACTITIONER

## 2023-07-03 PROCEDURE — 99024 POSTOP FOLLOW-UP VISIT: CPT | Mod: S$GLB,,, | Performed by: NURSE PRACTITIONER

## 2023-07-03 PROCEDURE — 1160F RVW MEDS BY RX/DR IN RCRD: CPT | Mod: CPTII,S$GLB,, | Performed by: NURSE PRACTITIONER

## 2023-07-03 PROCEDURE — 1160F PR REVIEW ALL MEDS BY PRESCRIBER/CLIN PHARMACIST DOCUMENTED: ICD-10-PCS | Mod: CPTII,S$GLB,, | Performed by: NURSE PRACTITIONER

## 2023-07-03 PROCEDURE — 1159F MED LIST DOCD IN RCRD: CPT | Mod: CPTII,S$GLB,, | Performed by: NURSE PRACTITIONER

## 2023-07-03 RX ORDER — CETIRIZINE HYDROCHLORIDE 10 MG/1
10 TABLET ORAL
COMMUNITY
Start: 2023-06-30 | End: 2023-11-01 | Stop reason: SDUPTHER

## 2023-07-03 RX ORDER — FLUTICASONE PROPIONATE 50 MCG
SPRAY, SUSPENSION (ML) NASAL
COMMUNITY
Start: 2023-06-30 | End: 2024-02-02

## 2023-07-03 NOTE — ASSESSMENT & PLAN NOTE
She is doing great post operative and will alert me to any issues. Pathology reports were discussed in detail. All questions were answered and recommendations were made for future treatments/follow-up. She understands the importance of monthly self-breast exams and knows to notify me of any and all changes as they occu

## 2023-07-03 NOTE — ASSESSMENT & PLAN NOTE
We discussed our fibrocystic mastopathy protocol in detail. She knows that if she follows this protocol - that her symptoms should improve.  We discussed how breast pain is usually not associated with breast cancer, however, pain can be the presenting symptom with some cancers (but this could be coincidental). Still, if her pain does not improve in 8-12 weeks she should call us back for additional recommendations.'

## 2023-09-13 ENCOUNTER — HOSPITAL ENCOUNTER (EMERGENCY)
Facility: HOSPITAL | Age: 23
Discharge: HOME | End: 2023-09-13
Attending: EMERGENCY MEDICINE
Payer: MEDICAID

## 2023-09-13 VITALS
HEART RATE: 98 BPM | SYSTOLIC BLOOD PRESSURE: 137 MMHG | RESPIRATION RATE: 18 BRPM | WEIGHT: 259.5 LBS | DIASTOLIC BLOOD PRESSURE: 81 MMHG | TEMPERATURE: 99 F | OXYGEN SATURATION: 98 % | BODY MASS INDEX: 44.3 KG/M2 | HEIGHT: 64 IN

## 2023-09-13 DIAGNOSIS — R06.02 SHORTNESS OF BREATH: ICD-10-CM

## 2023-09-13 DIAGNOSIS — J06.9 VIRAL URI: Primary | ICD-10-CM

## 2023-09-13 LAB
CTP QC/QA: YES
CTP QC/QA: YES
POC MOLECULAR INFLUENZA A AGN: NEGATIVE
POC MOLECULAR INFLUENZA B AGN: NEGATIVE
SARS-COV-2 RDRP RESP QL NAA+PROBE: NEGATIVE

## 2023-09-13 PROCEDURE — 99284 EMERGENCY DEPT VISIT MOD MDM: CPT | Mod: 25,ER

## 2023-09-13 PROCEDURE — 87502 INFLUENZA DNA AMP PROBE: CPT | Mod: ER

## 2023-09-13 PROCEDURE — 87635 SARS-COV-2 COVID-19 AMP PRB: CPT | Mod: ER | Performed by: REGISTERED NURSE

## 2023-09-13 RX ORDER — PROMETHAZINE HYDROCHLORIDE AND DEXTROMETHORPHAN HYDROBROMIDE 6.25; 15 MG/5ML; MG/5ML
5 SYRUP ORAL EVERY 6 HOURS PRN
Qty: 150 ML | Refills: 0 | Status: SHIPPED | OUTPATIENT
Start: 2023-09-13 | End: 2023-09-23

## 2023-09-13 RX ORDER — ALBUTEROL SULFATE 90 UG/1
1-2 AEROSOL, METERED RESPIRATORY (INHALATION) EVERY 6 HOURS PRN
Qty: 18 G | Refills: 0 | Status: SHIPPED | OUTPATIENT
Start: 2023-09-13 | End: 2024-02-02

## 2023-09-13 NOTE — ED PROVIDER NOTES
Encounter Date: 9/13/2023       History     Chief Complaint   Patient presents with    Fever     Fever, chills, headache, SOB, cough, recent dx with strep      23-year-old female presents emergency department with complaints of fever, chills, headache, cough and shortness of breath.  Recently diagnosed with strep.  Currently taking antibiotics.  Patient denies any abdominal pain, nausea/vomiting, chest pain or any other symptoms.    The history is provided by the patient.     Review of patient's allergies indicates:  No Known Allergies  Past Medical History:   Diagnosis Date    Anxiety     Bipolar disorder     Breast pain, left 4/14/2023    Mass of left breast 4/2/2023    Migraine headache     Obesity     Restless leg syndrome     Scoliosis      Past Surgical History:   Procedure Laterality Date    CYST REMOVAL      TYMPANOSTOMY TUBE PLACEMENT      Age 2     Family History   Problem Relation Age of Onset    Breast cancer Mother     Cancer Mother         lymph node    Diabetes Mother         Type 2    Obesity Mother     Melanoma Father     Hyperlipidemia Maternal Grandmother     Osteoporosis Maternal Grandmother     Hyperlipidemia Maternal Grandfather     Hypertension Maternal Grandfather     Kidney disease Maternal Grandfather     Lupus Paternal Grandmother     Breast cancer Other      Social History     Tobacco Use    Smoking status: Never    Smokeless tobacco: Never   Substance Use Topics    Alcohol use: Yes     Comment: occ    Drug use: No     Review of Systems   Constitutional:  Positive for chills and fever.   HENT:  Negative for sore throat.    Respiratory:  Positive for cough and shortness of breath.    Cardiovascular:  Negative for chest pain.   Gastrointestinal:  Negative for nausea.   Genitourinary:  Negative for dysuria.   Musculoskeletal:  Negative for back pain.   Skin:  Negative for rash.   Neurological:  Positive for headaches. Negative for weakness.   Hematological:  Does not bruise/bleed easily.    All other systems reviewed and are negative.      Physical Exam     Initial Vitals [09/13/23 1635]   BP Pulse Resp Temp SpO2   (!) 143/83 110 20 99.5 °F (37.5 °C) 98 %      MAP       --         Physical Exam    Constitutional: She appears well-developed and well-nourished. She is not diaphoretic. No distress.   HENT:   Head: Normocephalic and atraumatic.   Eyes: Conjunctivae and EOM are normal. Pupils are equal, round, and reactive to light.   Neck: Neck supple.   Normal range of motion.  Cardiovascular:  Normal rate, regular rhythm and normal heart sounds.           No murmur heard.  Pulmonary/Chest: Breath sounds normal. No respiratory distress. She has no wheezes. She has no rales.   Abdominal: Abdomen is soft. Bowel sounds are normal. There is no abdominal tenderness. There is no rebound and no guarding.   Musculoskeletal:         General: No tenderness or edema. Normal range of motion.      Cervical back: Normal range of motion and neck supple.     Neurological: She is alert and oriented to person, place, and time. No cranial nerve deficit. GCS score is 15. GCS eye subscore is 4. GCS verbal subscore is 5. GCS motor subscore is 6.   Skin: Skin is warm and dry. Capillary refill takes less than 2 seconds.   Psychiatric: She has a normal mood and affect. Thought content normal.         ED Course   Procedures  Labs Reviewed   POCT INFLUENZA A/B MOLECULAR   SARS-COV-2 RDRP GENE          Imaging Results              X-Ray Chest 1 View (Final result)  Result time 09/13/23 17:36:18      Final result by Norbert Shell MD (09/13/23 17:36:18)                   Impression:      No acute abnormality.      Electronically signed by: Norbert Shell  Date:    09/13/2023  Time:    17:36               Narrative:    EXAMINATION:  XR CHEST 1 VIEW    CLINICAL HISTORY:  Shortness of breath    TECHNIQUE:  Single frontal view of the chest was performed.    COMPARISON:  None    FINDINGS:  The lungs are clear, with normal appearance of  pulmonary vasculature and no pleural effusion or pneumothorax.    The cardiac silhouette is normal in size. The hilar and mediastinal contours are unremarkable.    Bones are intact.                                       Medications - No data to display  Medical Decision Making  Amount and/or Complexity of Data Reviewed  Labs: ordered.     Details: Negative  Radiology: ordered.     Details: Negative  Discussion of management or test interpretation with external provider(s): Treatment cough medicine and albuterol inhaler.  Follow up with primary care.  Return the emergency department for worsening symptoms.    Risk  Prescription drug management.                               Clinical Impression:   Final diagnoses:  [R06.02] Shortness of breath  [J06.9] Viral URI (Primary)        ED Disposition Condition    Discharge Stable          ED Prescriptions       Medication Sig Dispense Start Date End Date Auth. Provider    promethazine-dextromethorphan (PROMETHAZINE-DM) 6.25-15 mg/5 mL Syrp Take 5 mLs by mouth every 6 (six) hours as needed (cough). 150 mL 9/13/2023 9/23/2023 Thang Alcocer Jr., JHONY    albuterol (PROVENTIL/VENTOLIN HFA) 90 mcg/actuation inhaler Inhale 1-2 puffs into the lungs every 6 (six) hours as needed for Wheezing. Rescue 18 g 9/13/2023 9/12/2024 Thang Alcocer Jr., JHONY          Follow-up Information       Follow up With Specialties Details Why Contact Info    Gregory Louis MD Family Medicine In 1 week  [id number] [facility]  [place] [address]  [phone]               Thang Alcocer Jr., FNP  09/13/23 2579

## 2023-09-13 NOTE — Clinical Note
"Nataliya Ibarra (Elizabeth) was seen and treated in our emergency department on 9/13/2023.  She may return to work on 09/15/2023.       If you have any questions or concerns, please don't hesitate to call.       RN    "

## 2023-09-13 NOTE — Clinical Note
"Nataliya Ibarra (Elizabeth) was seen and treated in our emergency department on 9/13/2023.  She may return to work on 09/16/2023.       If you have any questions or concerns, please don't hesitate to call.       RN    "

## 2023-09-18 ENCOUNTER — TELEPHONE (OUTPATIENT)
Dept: NEUROLOGY | Facility: CLINIC | Age: 23
End: 2023-09-18
Payer: MEDICAID

## 2023-11-01 ENCOUNTER — HOSPITAL ENCOUNTER (EMERGENCY)
Facility: HOSPITAL | Age: 23
Discharge: HOME | End: 2023-11-01
Attending: EMERGENCY MEDICINE
Payer: MEDICAID

## 2023-11-01 VITALS
RESPIRATION RATE: 18 BRPM | HEART RATE: 88 BPM | OXYGEN SATURATION: 97 % | HEIGHT: 64 IN | SYSTOLIC BLOOD PRESSURE: 136 MMHG | DIASTOLIC BLOOD PRESSURE: 82 MMHG | WEIGHT: 260.13 LBS | BODY MASS INDEX: 44.41 KG/M2 | TEMPERATURE: 98 F

## 2023-11-01 DIAGNOSIS — R09.81 NASAL CONGESTION: ICD-10-CM

## 2023-11-01 DIAGNOSIS — R05.9 COUGH: Primary | ICD-10-CM

## 2023-11-01 LAB
CTP QC/QA: YES
CTP QC/QA: YES
GROUP A STREP, MOLECULAR: NEGATIVE
POC MOLECULAR INFLUENZA A AGN: NEGATIVE
POC MOLECULAR INFLUENZA B AGN: NEGATIVE
SARS-COV-2 RDRP RESP QL NAA+PROBE: NEGATIVE

## 2023-11-01 PROCEDURE — 87502 INFLUENZA DNA AMP PROBE: CPT | Mod: ER

## 2023-11-01 PROCEDURE — 87651 STREP A DNA AMP PROBE: CPT | Mod: ER | Performed by: NURSE PRACTITIONER

## 2023-11-01 PROCEDURE — 87635 SARS-COV-2 COVID-19 AMP PRB: CPT | Mod: ER | Performed by: NURSE PRACTITIONER

## 2023-11-01 PROCEDURE — 99283 EMERGENCY DEPT VISIT LOW MDM: CPT | Mod: 25,ER

## 2023-11-01 RX ORDER — GUAIFENESIN/DEXTROMETHORPHAN 100-10MG/5
10 SYRUP ORAL EVERY 4 HOURS PRN
Qty: 300 ML | Refills: 0 | Status: SHIPPED | OUTPATIENT
Start: 2023-11-01 | End: 2023-11-06

## 2023-11-01 RX ORDER — CETIRIZINE HYDROCHLORIDE 10 MG/1
10 TABLET ORAL DAILY
Qty: 30 TABLET | Refills: 0 | Status: SHIPPED | OUTPATIENT
Start: 2023-11-01 | End: 2024-02-16

## 2023-11-01 RX ORDER — PREDNISONE 20 MG/1
40 TABLET ORAL DAILY
Qty: 10 TABLET | Refills: 0 | Status: SHIPPED | OUTPATIENT
Start: 2023-11-01 | End: 2023-11-06

## 2023-11-01 NOTE — ED PROVIDER NOTES
"Emergency Medicine Provider Note - 11/1/2023       History     Chief Complaint   Patient presents with    Sinusitis     Cough, runny nose, stuffy sensation, trouble breathing, no signs of respiratory distress while during triage, onset 1 week ago after having a sinus infection, deny hx of asthma        Allergies:  Review of patient's allergies indicates:  No Known Allergies     History of Present Illness   HPI    11/1/2023, 5:36 PM***  The history is provided by the {adult  relatives:34388::"patient"}    Nataliya Ibarra is a 23 y.o. female presenting to the ED for ***      Arrival mode: {[facility] Arrival Choices:29949}     PCP: Gregory Louis MD     Past Medical History:  Past Medical History:   Diagnosis Date    Anxiety     Bipolar disorder     Breast pain, left 4/14/2023    Mass of left breast 4/2/2023    Migraine headache     Obesity     Restless leg syndrome     Scoliosis        Past Surgical History:  Past Surgical History:   Procedure Laterality Date    CYST REMOVAL      TYMPANOSTOMY TUBE PLACEMENT      Age 2         Family History:  Family History   Problem Relation Age of Onset    Breast cancer Mother     Cancer Mother         lymph node    Diabetes Mother         Type 2    Obesity Mother     Melanoma Father     Hyperlipidemia Maternal Grandmother     Osteoporosis Maternal Grandmother     Hyperlipidemia Maternal Grandfather     Hypertension Maternal Grandfather     Kidney disease Maternal Grandfather     Lupus Paternal Grandmother     Breast cancer Other        Social History:  Social History     Tobacco Use    Smoking status: Never    Smokeless tobacco: Never   Substance and Sexual Activity    Alcohol use: Yes     Comment: occ    Drug use: No    Sexual activity: Never        Review of Systems   Review of Systems    ***   Physical Exam     Initial Vitals [11/01/23 1600]   BP Pulse Resp Temp SpO2   136/82 88 18 98.1 °F (36.7 °C) 97 %      MAP       --          Physical Exam    Nursing Notes and " "Vital Signs Reviewed.  Constitutional: Patient is in {DISTRESS LEVEL:50279}. Well-developed and well-nourished.  Head: Atraumatic. Normocephalic.  Eyes: PERRL. EOM intact. Conjunctivae are not pale. No scleral icterus.  ENT: Mucous membranes are moist. Oropharynx is clear and symmetric***.    Neck: Supple. Full ROM. No lymphadenopathy.  Cardiovascular: Regular rate. Regular rhythm***. No murmurs, rubs, or gallops. Distal pulses are 2+ and symmetric***.  Pulmonary/Chest: No respiratory distress. Clear to auscultation bilaterally***. No wheezing or rales.  Abdominal: Soft and non-distended.  There is no tenderness.  No rebound, guarding, or rigidity. Good bowel sounds***.  Genitourinary: No*** CVA tenderness  Musculoskeletal: Moves all extremities. No obvious deformities. No edema. No calf tenderness***.  Skin: Warm and dry.  Neurological:  Alert, awake, and appropriate.  Normal speech.  No acute focal neurological deficits are appreciated.  Psychiatric: Normal affect. Good eye contact. Appropriate in content.     ED Course   ED Procedures:  Procedures    ED Vital Signs:  Vitals:    11/01/23 1600   BP: 136/82   Pulse: 88   Resp: 18   Temp: 98.1 °F (36.7 °C)   TempSrc: Oral   SpO2: 97%   Weight: 118 kg (260 lb 2.3 oz)   Height: 5' 4" (1.626 m)       Abnormal Lab Results:  Labs Reviewed   GROUP A STREP, MOLECULAR   SARS-COV-2 RDRP GENE   POCT INFLUENZA A/B MOLECULAR        All Lab Results:  ***    Reviewed Prior Labs:  ***    The EKG was ordered, reviewed, and independently interpreted by the ED provider:  Rate: *** BPM  Rhythm: {rhythm:04213}  Interpretation: ***. No STEMI.  When compared to EKG performed ***, there are no significant change        Imaging Results:  Imaging Results              X-Ray Chest PA And Lateral (Final result)  Result time 11/01/23 17:33:15      Final result by Demetrio Benedict MD (11/01/23 17:33:15)                   Impression:      No acute abnormality identified.      Electronically " "signed by: Demetrio Benedict  Date:    11/01/2023  Time:    17:33               Narrative:    EXAMINATION:  XR CHEST PA AND LATERAL    CLINICAL HISTORY:  Cough, unspecified    TECHNIQUE:  PA and lateral views of the chest were performed.    COMPARISON:  Multiple priors.    FINDINGS:  The lungs are clear, with normal appearance of pulmonary vasculature and no pleural effusion or pneumothorax.    The cardiac silhouette is normal in size. The hilar and mediastinal contours are unremarkable.    Bones are intact.                                            The Emergency Provider reviewed the vital signs and test results, which are outlined above.     ED Discussion   ED Medication(s):  Medications - No data to display         ***     *** Reassessment: Dr. Llanos reassessed the pt.  The pt is resting comfortably and is NAD.  Pt states their sx have improved.*** Discussed test results, shared treatment plan, specific conditions for return, and the need for f/u.***  Answered their questions at this time.  Pt understands and agrees to the plan.  The pt has remained hemodynamically stable through ED course and is stable for discharge.    I discussed with patient and/or family/caretaker that evaluation in the ED does not suggest any emergent or life threatening medical conditions requiring immediate intervention beyond what was provided in the ED, and I believe patient is safe for discharge.  Regardless, an unremarkable evaluation in the ED does not preclude the development or presence of a serious of life threatening condition. As such, patient was instructed to return immediately for any worsening or change in current symptoms.     MIPS Measures     Smoker? {Yes No :77963}     Hypertension: {Blank single:33763::"Pre-hypertension/Hypertension: The pt has been informed that they may have pre-hypertension or hypertension based on a blood pressure reading in the ED. I recommend that the pt call the PCP listed on their discharge " "instructions or a physician of their choice this week to arrange f/u for further evaluation of possible pre-hypertension or hypertension. ","History of Hypertension: The patient has elevated blood pressure (higher than 120/80) while being treated in the ED but has a history of hypertension.","None"}    ***   Medical Decision Making                 Medical Decision Making  Amount and/or Complexity of Data Reviewed  Labs: ordered.  Radiology: ordered.        Coding    Prescription Management: I performed a review of the patient's current Rx medication list as input by nursing staff.    Patient's Medications   New Prescriptions    No medications on file   Previous Medications    ALBUTEROL (PROVENTIL/VENTOLIN HFA) 90 MCG/ACTUATION INHALER    Inhale 1-2 puffs into the lungs every 6 (six) hours as needed for Wheezing. Rescue    ALPRAZOLAM (XANAX) 1 MG TABLET    Take 1 tablet (1 mg total) by mouth 2 (two) times daily as needed for Anxiety.    CETIRIZINE (ZYRTEC) 10 MG TABLET    Take 10 mg by mouth.    FERROUS SULFATE (IRON ORAL)    Take by mouth.    FLUTICASONE PROPIONATE (FLONASE) 50 MCG/ACTUATION NASAL SPRAY    by Each Nostril route.    TRI-SPRINTEC, 28, 0.18/0.215/0.25 MG-35 MCG (28) TABLET    Take 1 tablet by mouth every evening.   Modified Medications    No medications on file   Discontinued Medications    No medications on file        Discussed case with:{Specialist List (Optional):11194}    ***  Portions of this note may have been created with voice recognition software. Occasional "wrong-word" or "sound-a-like" substitutions may have occurred due to the inherent limitations of voice recognition software. Please, read the note carefully and recognize, using context, where substitutions have occurred.          Clinical Impression       ICD-10-CM ICD-9-CM   1. Cough  R05.9 786.2         ED Disposition     Disposition: {Plan; disposition:56016}  Patient condition: {Condition:19972::"Good"}    ED Follow-up            "

## 2023-11-01 NOTE — ED PROVIDER NOTES
Encounter Date: 11/1/2023       History     Chief Complaint   Patient presents with    Sinusitis     Cough, runny nose, stuffy sensation, trouble breathing, no signs of respiratory distress while during triage, onset 1 week ago after having a sinus infection, deny hx of asthma      Patient presents to ER for cough, onset several days ago.  Associated symptoms include nasal congestion, shortness of breath.  She has tried nothing for the symptoms.  Symptoms have been constant since onset.  She denies fever, chills, generalized body aches, sore throat, weakness, fatigue, chest pain.    The history is provided by the patient.     Review of patient's allergies indicates:  No Known Allergies  Past Medical History:   Diagnosis Date    Anxiety     Bipolar disorder     Breast pain, left 4/14/2023    Mass of left breast 4/2/2023    Migraine headache     Obesity     Restless leg syndrome     Scoliosis      Past Surgical History:   Procedure Laterality Date    CYST REMOVAL      TYMPANOSTOMY TUBE PLACEMENT      Age 2     Family History   Problem Relation Age of Onset    Breast cancer Mother     Cancer Mother         lymph node    Diabetes Mother         Type 2    Obesity Mother     Melanoma Father     Hyperlipidemia Maternal Grandmother     Osteoporosis Maternal Grandmother     Hyperlipidemia Maternal Grandfather     Hypertension Maternal Grandfather     Kidney disease Maternal Grandfather     Lupus Paternal Grandmother     Breast cancer Other      Social History     Tobacco Use    Smoking status: Never    Smokeless tobacco: Never   Substance Use Topics    Alcohol use: Yes     Comment: occ    Drug use: No     Review of Systems   Constitutional:  Negative for chills, fatigue and fever.   HENT:  Positive for congestion (Nasal). Negative for ear pain, sinus pressure, sinus pain and sore throat.    Eyes:  Negative for pain.   Respiratory:  Positive for cough and shortness of breath.    Cardiovascular:  Negative for chest pain and  palpitations.   Gastrointestinal:  Negative for abdominal pain, nausea and vomiting.   Genitourinary:  Negative for dysuria.   Musculoskeletal:  Negative for back pain, myalgias and neck pain.   Skin:  Negative for rash.   Neurological:  Negative for weakness and headaches.   All other systems reviewed and are negative.      Physical Exam     Initial Vitals [11/01/23 1600]   BP Pulse Resp Temp SpO2   136/82 88 18 98.1 °F (36.7 °C) 97 %      MAP       --         Physical Exam    Nursing note and vitals reviewed.  Constitutional: She is not diaphoretic. She is cooperative.  Non-toxic appearance. She does not have a sickly appearance. She does not appear ill. No distress.   HENT:   Head: Normocephalic and atraumatic.   Right Ear: External ear normal.   Left Ear: External ear normal.   Nose: Nose normal. Right sinus exhibits no maxillary sinus tenderness. Left sinus exhibits no maxillary sinus tenderness.   Mouth/Throat: Mucous membranes are normal. Posterior oropharyngeal erythema (Mild) present. No oropharyngeal exudate or posterior oropharyngeal edema.   Eyes: Conjunctivae are normal.   Neck: Neck supple.   Normal range of motion.  Cardiovascular:  Normal rate, regular rhythm and intact distal pulses.           Pulmonary/Chest: Breath sounds normal. No respiratory distress. She has no wheezes. She has no rhonchi. She has no rales.   Respirations even and nonlabored, no acute distress.  Speaks in full sentences without difficulty.   Musculoskeletal:         General: Normal range of motion.      Cervical back: Normal range of motion and neck supple.     Neurological: She is alert and oriented to person, place, and time. She has normal strength. GCS score is 15. GCS eye subscore is 4. GCS verbal subscore is 5. GCS motor subscore is 6.   Skin: Skin is warm and dry. Capillary refill takes less than 2 seconds.         ED Course   Procedures  Labs Reviewed   GROUP A STREP, MOLECULAR   SARS-COV-2 RDRP GENE   POCT INFLUENZA  A/B MOLECULAR        Results for orders placed or performed during the hospital encounter of 11/01/23   Group A Strep, Molecular    Specimen: Throat   Result Value Ref Range    Group A Strep, Molecular Negative Negative   POCT COVID-19 Rapid Screening   Result Value Ref Range    POC Rapid COVID Negative Negative     Acceptable Yes    POCT Influenza A/B Molecular   Result Value Ref Range    POC Molecular Influenza A Ag Negative Negative, Not Reported    POC Molecular Influenza B Ag Negative Negative, Not Reported     Acceptable Yes          Imaging Results              X-Ray Chest PA And Lateral (Final result)  Result time 11/01/23 17:33:15      Final result by Demetrio Benedict MD (11/01/23 17:33:15)                   Impression:      No acute abnormality identified.      Electronically signed by: Demetrio Benedict  Date:    11/01/2023  Time:    17:33               Narrative:    EXAMINATION:  XR CHEST PA AND LATERAL    CLINICAL HISTORY:  Cough, unspecified    TECHNIQUE:  PA and lateral views of the chest were performed.    COMPARISON:  Multiple priors.    FINDINGS:  The lungs are clear, with normal appearance of pulmonary vasculature and no pleural effusion or pneumothorax.    The cardiac silhouette is normal in size. The hilar and mediastinal contours are unremarkable.    Bones are intact.                                       Medications - No data to display  Medical Decision Making  Amount and/or Complexity of Data Reviewed  Labs: ordered.  Radiology: ordered.    Risk  OTC drugs.  Prescription drug management.                Discussed results with patient she verbalized understanding with no further concerns.  Discussed outpatient follow-up with her PCP.  Discussed signs and symptoms to return to ER.  Patient is nontoxic/non ill-appearing.  Vital signs stable.  Breath sounds clear to auscultation bilaterally.  No respiratory distress is noted.  Patient agrees with this plan and voices  no further concerns.  Patient stable for discharge at this time.  I discussed with patient  that evaluation in the ED does not suggest any emergent or life threatening medical conditions requiring immediate intervention beyond what was provided in the ED, and I believe patient is safe for discharge. Regardless, an unremarkable evaluation in the ED does not preclude the development or presence of a serious of life threatening condition. As such, patient was instructed to return immediately for any worsening or change in current symptoms.                 Clinical Impression:   Final diagnoses:  [R05.9] Cough (Primary)  [R09.81] Nasal congestion        ED Disposition Condition    Discharge Stable          ED Prescriptions       Medication Sig Dispense Start Date End Date Auth. Provider    cetirizine (ZYRTEC) 10 MG tablet Take 1 tablet (10 mg total) by mouth once daily. 30 tablet 11/1/2023 12/1/2023 Salvador Harris NP    predniSONE (DELTASONE) 20 MG tablet Take 2 tablets (40 mg total) by mouth once daily. for 5 days 10 tablet 11/1/2023 11/6/2023 Salvador Harris NP    dextromethorphan-guaiFENesin  mg/5 ml (ROBITUSSIN-DM)  mg/5 mL liquid Take 10 mLs by mouth every 4 (four) hours as needed (cough). 300 mL 11/1/2023 11/6/2023 Salvador Harris NP          Follow-up Information       Follow up With Specialties Details Why Contact Info    [place] -  In 2 days  [address]  [place] [address]  [phone]      [place] - Emergency Dept Emergency Medicine  As needed, If symptoms worsen [address]  [facility] [id number] [phone]             Kimberly, Salvador G., NP  11/01/23 3986

## 2023-11-02 NOTE — ED NOTES
Patient examined, evaluated, and educated on discharge prescriptions and instructions by SONALI Franco. Patient discharged to lobby by SONALI Franco.

## 2023-11-16 ENCOUNTER — PATIENT MESSAGE (OUTPATIENT)
Dept: ADMINISTRATIVE | Facility: OTHER | Age: 23
End: 2023-11-16
Payer: MEDICAID

## 2023-11-29 ENCOUNTER — HOSPITAL ENCOUNTER (EMERGENCY)
Facility: HOSPITAL | Age: 23
Discharge: HOME | End: 2023-11-29
Attending: EMERGENCY MEDICINE
Payer: MEDICAID

## 2023-11-29 VITALS
OXYGEN SATURATION: 97 % | BODY MASS INDEX: 50.15 KG/M2 | TEMPERATURE: 98 F | HEIGHT: 61 IN | WEIGHT: 265.63 LBS | RESPIRATION RATE: 20 BRPM | HEART RATE: 82 BPM | SYSTOLIC BLOOD PRESSURE: 122 MMHG | DIASTOLIC BLOOD PRESSURE: 63 MMHG

## 2023-11-29 DIAGNOSIS — G43.909 MIGRAINE WITHOUT STATUS MIGRAINOSUS, NOT INTRACTABLE, UNSPECIFIED MIGRAINE TYPE: Primary | ICD-10-CM

## 2023-11-29 PROCEDURE — 96372 THER/PROPH/DIAG INJ SC/IM: CPT | Performed by: NURSE PRACTITIONER

## 2023-11-29 PROCEDURE — 99284 EMERGENCY DEPT VISIT MOD MDM: CPT | Mod: 25,ER

## 2023-11-29 PROCEDURE — 63600175 PHARM REV CODE 636 W HCPCS: Mod: ER | Performed by: NURSE PRACTITIONER

## 2023-11-29 RX ORDER — METOCLOPRAMIDE HYDROCHLORIDE 5 MG/ML
10 INJECTION INTRAMUSCULAR; INTRAVENOUS
Status: COMPLETED | OUTPATIENT
Start: 2023-11-29 | End: 2023-11-29

## 2023-11-29 RX ORDER — DIPHENHYDRAMINE HYDROCHLORIDE 50 MG/ML
25 INJECTION INTRAMUSCULAR; INTRAVENOUS
Status: COMPLETED | OUTPATIENT
Start: 2023-11-29 | End: 2023-11-29

## 2023-11-29 RX ORDER — KETOROLAC TROMETHAMINE 30 MG/ML
60 INJECTION, SOLUTION INTRAMUSCULAR; INTRAVENOUS
Status: COMPLETED | OUTPATIENT
Start: 2023-11-29 | End: 2023-11-29

## 2023-11-29 RX ADMIN — METOCLOPRAMIDE 10 MG: 5 INJECTION, SOLUTION INTRAMUSCULAR; INTRAVENOUS at 05:11

## 2023-11-29 RX ADMIN — KETOROLAC TROMETHAMINE 60 MG: 30 INJECTION, SOLUTION INTRAMUSCULAR at 05:11

## 2023-11-29 RX ADMIN — DIPHENHYDRAMINE HYDROCHLORIDE 25 MG: 50 INJECTION INTRAMUSCULAR; INTRAVENOUS at 05:11

## 2023-11-30 ENCOUNTER — TELEPHONE (OUTPATIENT)
Dept: ADMINISTRATIVE | Facility: CLINIC | Age: 23
End: 2023-11-30
Payer: MEDICAID

## 2023-12-15 NOTE — ED PROVIDER NOTES
Encounter Date: 11/29/2023       History     Chief Complaint   Patient presents with    Headache     Migraine for 3 days -nothing working      Patient complains of migraine headache for several days        Review of patient's allergies indicates:  No Known Allergies  Past Medical History:   Diagnosis Date    Anxiety     Bipolar disorder     Breast pain, left 4/14/2023    Mass of left breast 4/2/2023    Migraine headache     Obesity     Restless leg syndrome     Scoliosis      Past Surgical History:   Procedure Laterality Date    CYST REMOVAL      TYMPANOSTOMY TUBE PLACEMENT      Age 2     Family History   Problem Relation Age of Onset    Breast cancer Mother     Cancer Mother         lymph node    Diabetes Mother         Type 2    Obesity Mother     Melanoma Father     Hyperlipidemia Maternal Grandmother     Osteoporosis Maternal Grandmother     Hyperlipidemia Maternal Grandfather     Hypertension Maternal Grandfather     Kidney disease Maternal Grandfather     Lupus Paternal Grandmother     Breast cancer Other      Social History     Tobacco Use    Smoking status: Never    Smokeless tobacco: Never   Substance Use Topics    Alcohol use: Yes     Comment: occ    Drug use: No     Review of Systems   Constitutional:  Negative for fever.   HENT:  Negative for sore throat.    Respiratory:  Negative for shortness of breath.    Cardiovascular:  Negative for chest pain.   Gastrointestinal:  Negative for nausea.   Genitourinary:  Negative for dysuria.   Musculoskeletal:  Negative for back pain.   Skin:  Negative for rash.   Neurological:  Negative for weakness.   Hematological:  Does not bruise/bleed easily.       Physical Exam     Initial Vitals [11/29/23 1627]   BP Pulse Resp Temp SpO2   122/63 82 20 98.4 °F (36.9 °C) 97 %      MAP       --         Physical Exam    Nursing note and vitals reviewed.  Constitutional: Nataliya Ibarra appears well-developed and well-nourished. Nataliya Ibarra is not diaphoretic.  Nataliya Ibarra is active.  Non-toxic appearance. No distress.   HENT:   Head: Normocephalic and atraumatic.   Eyes: Conjunctivae are normal. Right eye exhibits no discharge. Left eye exhibits no discharge. No scleral icterus.   Neck:   Normal range of motion.  Cardiovascular:  Normal rate, regular rhythm and intact distal pulses.           No murmur heard.  Pulmonary/Chest: Breath sounds normal. No respiratory distress. Nataliya Ibarra has no wheezes.   Abdominal: Nataliya Ibarra exhibits no distension.   Musculoskeletal:         General: No tenderness. Normal range of motion.      Cervical back: Normal range of motion.     Neurological: Nataliya Ibarra is alert and oriented to person, place, and time. No cranial nerve deficit. GCS score is 15. GCS eye subscore is 4. GCS verbal subscore is 5. GCS motor subscore is 6.   Skin: Skin is warm and dry. Capillary refill takes less than 2 seconds. No rash noted.   Psychiatric: Nataliya Ibarra has a normal mood and affect. Nataliya Mayberrys behavior is normal. Judgment and thought content normal.         ED Course   Procedures  Labs Reviewed - No data to display       Imaging Results              CT Head Without Contrast (Final result)  Result time 11/29/23 17:41:24      Final result by Demetrio Benedict MD (11/29/23 17:41:24)                   Impression:      No acute intracranial CT abnormality.    All CT scans at this facility are performed  using dose modulation techniques as appropriate to performed exam including the following:  automated exposure control; adjustment of mA and/or kV according to the patients size (this includes techniques or standardized protocols for targeted exams where dose is matched to indication/reason for exam: i.e. extremities or head);  iterative reconstruction technique.      Electronically signed by: Demetrio Benedict  Date:    11/29/2023  Time:    17:41               Narrative:    EXAMINATION:  CT  HEAD WITHOUT CONTRAST    CLINICAL HISTORY:  Headache, new or worsening, cancer suspected (Age 19-49y);    TECHNIQUE:  Low dose axial CT images obtained throughout the head without intravenous contrast. Sagittal and coronal reconstructions were performed.    COMPARISON:  05/07/2023    FINDINGS:  Intracranial compartment:    Ventricles and sulci are normal in size for age without evidence of hydrocephalus. No extra-axial blood or fluid collections.    The brain parenchyma appears normal. No parenchymal mass, hemorrhage, edema or major vascular distribution infarct.    Skull/extracranial contents (limited evaluation): No fracture. Mastoid air cells and paranasal sinuses are essentially clear.                                       Medications   ketorolac injection 60 mg (60 mg Intramuscular Given 11/29/23 1715)   metoclopramide HCl injection 10 mg (10 mg Intramuscular Given 11/29/23 1700)   diphenhydrAMINE injection 25 mg (25 mg Intramuscular Given 11/29/23 1700)     Medical Decision Making  Amount and/or Complexity of Data Reviewed  Radiology: ordered.    Risk  Prescription drug management.                                      Clinical Impression:  Final diagnoses:  [G43.909] Migraine without status migrainosus, not intractable, unspecified migraine type (Primary)          ED Disposition Condition    Discharge Stable          ED Prescriptions    None       Follow-up Information       Follow up With Specialties Details Why Contact Info    Neurology, [facility] - Neurology Schedule an appointment as soon as possible for a visit   [address] [place] [address]  [place] [address]  [phone]               Yosvany Yen NP  12/15/23 7656

## 2024-02-02 ENCOUNTER — OFFICE VISIT (OUTPATIENT)
Dept: OBSTETRICS AND GYNECOLOGY | Facility: CLINIC | Age: 24
End: 2024-02-02
Payer: MEDICAID

## 2024-02-02 ENCOUNTER — HOSPITAL ENCOUNTER (EMERGENCY)
Facility: HOSPITAL | Age: 24
Discharge: HOME | End: 2024-02-02
Attending: EMERGENCY MEDICINE
Payer: MEDICAID

## 2024-02-02 VITALS
TEMPERATURE: 98 F | OXYGEN SATURATION: 100 % | WEIGHT: 258.38 LBS | RESPIRATION RATE: 18 BRPM | SYSTOLIC BLOOD PRESSURE: 140 MMHG | BODY MASS INDEX: 48.78 KG/M2 | DIASTOLIC BLOOD PRESSURE: 81 MMHG | HEART RATE: 90 BPM | HEIGHT: 61 IN

## 2024-02-02 VITALS
BODY MASS INDEX: 49.3 KG/M2 | DIASTOLIC BLOOD PRESSURE: 64 MMHG | WEIGHT: 261.13 LBS | HEIGHT: 61 IN | SYSTOLIC BLOOD PRESSURE: 126 MMHG

## 2024-02-02 DIAGNOSIS — Z11.3 SCREENING FOR STD (SEXUALLY TRANSMITTED DISEASE): ICD-10-CM

## 2024-02-02 DIAGNOSIS — M25.531 RIGHT WRIST PAIN: Primary | ICD-10-CM

## 2024-02-02 DIAGNOSIS — N94.10 DYSPAREUNIA IN FEMALE: Primary | ICD-10-CM

## 2024-02-02 DIAGNOSIS — B37.31 YEAST VAGINITIS: ICD-10-CM
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
## 2024-02-02 DIAGNOSIS — I86.3 VARICOSE VEINS OF VULVA AND PERINEUM: ICD-10-CM

## 2024-02-02 DIAGNOSIS — N89.8 VAGINAL DISCHARGE: ICD-10-CM

## 2024-02-02 PROCEDURE — 3008F BODY MASS INDEX DOCD: CPT | Mod: CPTII,,, | Performed by: NURSE PRACTITIONER

## 2024-02-02 PROCEDURE — 1160F RVW MEDS BY RX/DR IN RCRD: CPT | Mod: CPTII,,, | Performed by: NURSE PRACTITIONER

## 2024-02-02 PROCEDURE — 99999PBSHW PR PBB SHADOW TECHNICAL ONLY FILED TO HB: Mod: PBBFAC,,,

## 2024-02-02 PROCEDURE — 87491 CHLMYD TRACH DNA AMP PROBE: CPT | Performed by: NURSE PRACTITIONER

## 2024-02-02 PROCEDURE — 87210 SMEAR WET MOUNT SALINE/INK: CPT | Mod: PBBFAC,PN | Performed by: NURSE PRACTITIONER

## 2024-02-02 PROCEDURE — 99999 PR PBB SHADOW E&M-EST. PATIENT-LVL III: CPT | Mod: PBBFAC,,, | Performed by: NURSE PRACTITIONER

## 2024-02-02 PROCEDURE — 3078F DIAST BP <80 MM HG: CPT | Mod: CPTII,,, | Performed by: NURSE PRACTITIONER

## 2024-02-02 PROCEDURE — 99214 OFFICE O/P EST MOD 30 MIN: CPT | Mod: S$PBB,,, | Performed by: NURSE PRACTITIONER

## 2024-02-02 PROCEDURE — 3074F SYST BP LT 130 MM HG: CPT | Mod: CPTII,,, | Performed by: NURSE PRACTITIONER

## 2024-02-02 PROCEDURE — 99283 EMERGENCY DEPT VISIT LOW MDM: CPT | Mod: 27,ER

## 2024-02-02 PROCEDURE — 1159F MED LIST DOCD IN RCRD: CPT | Mod: CPTII,,, | Performed by: NURSE PRACTITIONER

## 2024-02-02 PROCEDURE — 99213 OFFICE O/P EST LOW 20 MIN: CPT | Mod: PBBFAC,PN | Performed by: NURSE PRACTITIONER

## 2024-02-02 RX ORDER — BUTALBITAL, ACETAMINOPHEN AND CAFFEINE 50; 325; 40 MG/1; MG/1; MG/1
1 TABLET ORAL EVERY 6 HOURS PRN
COMMUNITY
Start: 2024-01-02

## 2024-02-02 RX ORDER — ASPIRIN 325 MG
50000 TABLET, DELAYED RELEASE (ENTERIC COATED) ORAL
COMMUNITY
Start: 2024-01-22

## 2024-02-02 RX ORDER — FLUOXETINE HYDROCHLORIDE 20 MG/1
20 CAPSULE ORAL 3 TIMES DAILY
COMMUNITY

## 2024-02-02 RX ORDER — FLUCONAZOLE 150 MG/1
TABLET ORAL
Qty: 2 TABLET | Refills: 1 | Status: SHIPPED | OUTPATIENT
Start: 2024-02-02 | End: 2024-02-16

## 2024-02-02 RX ORDER — DOXYCYCLINE 100 MG/1
100 CAPSULE ORAL 2 TIMES DAILY
Qty: 14 CAPSULE | Refills: 0 | Status: SHIPPED | OUTPATIENT
Start: 2024-02-02 | End: 2024-02-09

## 2024-02-02 NOTE — ED PROVIDER NOTES
Encounter Date: 2/2/2024       History     Chief Complaint   Patient presents with    Wrist Pain     Fell 2 days ago landing on her right wrist. Seen at Urgent care today and sent here for xray.     Fell two days ago, injuring her right hand.  Went to and urgent care and sent here for an x-ray.    The history is provided by the patient.     Review of patient's allergies indicates:  No Known Allergies  Past Medical History:   Diagnosis Date    Anxiety     Bipolar disorder     Breast pain, left 4/14/2023    Mass of left breast 4/2/2023    Migraine headache     Obesity     Restless leg syndrome     Scoliosis      Past Surgical History:   Procedure Laterality Date    CYST REMOVAL      TYMPANOSTOMY TUBE PLACEMENT      Age 2     Family History   Problem Relation Age of Onset    Breast cancer Mother     Cancer Mother         lymph node    Diabetes Mother         Type 2    Obesity Mother     Melanoma Father     Hyperlipidemia Maternal Grandmother     Osteoporosis Maternal Grandmother     Hyperlipidemia Maternal Grandfather     Hypertension Maternal Grandfather     Kidney disease Maternal Grandfather     Lupus Paternal Grandmother     Breast cancer Other      Social History     Tobacco Use    Smoking status: Never    Smokeless tobacco: Never   Substance Use Topics    Alcohol use: Yes     Comment: occ    Drug use: No     Review of Systems   Constitutional:  Negative for fever.   HENT:  Negative for sore throat.    Respiratory:  Negative for shortness of breath.    Cardiovascular:  Negative for chest pain.   Gastrointestinal:  Negative for nausea.   Genitourinary:  Negative for dysuria.   Musculoskeletal:  Negative for back pain.   Skin:  Negative for rash.   Neurological:  Negative for weakness.   Hematological:  Does not bruise/bleed easily.       Physical Exam     Initial Vitals [02/02/24 1603]   BP Pulse Resp Temp SpO2   (!) 140/81 90 18 98.4 °F (36.9 °C) 100 %      MAP       --         Physical Exam    Constitutional:  Nataliya Ibarra appears well-developed and well-nourished. No distress.   HENT:   Head: Normocephalic and atraumatic.   Eyes: Conjunctivae are normal. Pupils are equal, round, and reactive to light.   Neck: Neck supple.   Normal range of motion.  Cardiovascular:  Normal rate, regular rhythm and normal heart sounds.           Pulmonary/Chest: Breath sounds normal.   Abdominal: Abdomen is soft. Bowel sounds are normal.   Musculoskeletal:         General: Normal range of motion.      Right hand: Swelling and tenderness present. No deformity or bony tenderness.      Cervical back: Normal range of motion and neck supple.     Neurological: Nataliya Ibarra is alert and oriented to person, place, and time. No cranial nerve deficit.   Skin: Skin is warm and dry.   Psychiatric: Nataliya Ibarra has a normal mood and affect.         ED Course   Procedures  Labs Reviewed - No data to display       Imaging Results              X-Ray Hand 3 view Right (Final result)  Result time 02/02/24 16:32:39      Final result by Norbert Shell MD (02/02/24 16:32:39)                   Impression:      No acute process seen.  Findings as above.  Recommend follow-up if symptoms persist.      Electronically signed by: Norbert Shell  Date:    02/02/2024  Time:    16:32               Narrative:    EXAMINATION:  XR HAND COMPLETE 3 VIEW RIGHT    CLINICAL HISTORY:  hand pain;    TECHNIQUE:  PA, lateral, and oblique views of the right hand were performed.    COMPARISON:  None    FINDINGS:  No acute fracture or dislocation.  Normal bone mineral density.  Joint spaces are unremarkable.  Soft tissues within normal limits.                                       Medications - No data to display  Medical Decision Making  Fell two days ago, injuring her right hand.  Went to and urgent care and sent here for an x-ray.  DDx: hand pain, fracture    Amount and/or Complexity of Data Reviewed  Radiology: ordered.     Details: No acute  findings                                      Clinical Impression:  Final diagnoses:  [M25.531] Right wrist pain (Primary)  [W19.XXXA] Fall, initial encounter          ED Disposition Condition    Discharge Stable          ED Prescriptions    None       Follow-up Information       Follow up With Specialties Details Why Contact Info    Gregory Louis MD Family Medicine   [address] [facility] [address] [facility] [place]  [place] [address]  [phone]               Mayank Higginbotham MD  02/02/24 6108

## 2024-02-02 NOTE — PROGRESS NOTES
"Nataliya Ibarra is a 24 y.o. adult  presents with complaint of pain with intercourse. Pt with same partner for 10 yrs so not changes otherwise.  Feels most pain to left side.      LMP 1/10/24    Past Medical History:   Diagnosis Date    Anxiety     Bipolar disorder     Breast pain, left 2023    Mass of left breast 2023    Migraine headache     Obesity     Restless leg syndrome     Scoliosis      Past Surgical History:   Procedure Laterality Date    CYST REMOVAL      TYMPANOSTOMY TUBE PLACEMENT      Age 2     Social History     Tobacco Use    Smoking status: Never    Smokeless tobacco: Never   Substance Use Topics    Alcohol use: Yes     Comment: occ    Drug use: No     Family History   Problem Relation Age of Onset    Breast cancer Mother     Cancer Mother         lymph node    Diabetes Mother         Type 2    Obesity Mother     Melanoma Father     Hyperlipidemia Maternal Grandmother     Osteoporosis Maternal Grandmother     Hyperlipidemia Maternal Grandfather     Hypertension Maternal Grandfather     Kidney disease Maternal Grandfather     Lupus Paternal Grandmother     Breast cancer Other      OB History    Para Term  AB Living   0 0 0 0 0 0   SAB IAB Ectopic Multiple Live Births   0 0 0 0 0       /64 (BP Location: Left arm, Patient Position: Sitting, BP Method: Large (Manual))   Ht 5' 1" (1.549 m)   Wt 118.5 kg (261 lb 2.2 oz)   LMP 01/10/2024   BMI 49.34 kg/m²     ROS:  Per hpi    PHYSICAL EXAM:  VULVA: normal other than lesli varicosities noted to perineal area, VAGINA: vaginal discharge - copious and creamy, WET MOUNT done - results: spores, white blood cells, CERVIX: cervical discharge present - copious and creamy, DNA probe for chlamydia and GC obtained, UTERUS: uterus is normal size, shape, consistency and tender, ADNEXA: normal adnexa in size, nontender and no masses  Physical Exam     ASSESSMENT and PLAN:  1. Dyspareunia in female  US Pelvis Comp with Transvag " NON-OB (xpd    C. trachomatis/N. gonorrhoeae by AMP DNA      2. Vaginal discharge  C. trachomatis/N. gonorrhoeae by AMP DNA    POCT Wet Prep    doxycycline (MONODOX) 100 MG capsule    fluconazole (DIFLUCAN) 150 MG Tab      3. Yeast vaginitis  POCT Wet Prep    fluconazole (DIFLUCAN) 150 MG Tab      4. Screening for STD (sexually transmitted disease)  C. trachomatis/N. gonorrhoeae by AMP DNA      5. Varicose veins of vulva and perineum            Nataliya was seen today for painful intercourse.    Diagnoses and all orders for this visit:    Dyspareunia in female  -     US Pelvis Comp with Transvag NON-OB (xpd; Future  -     C. trachomatis/N. gonorrhoeae by AMP DNA    Vaginal discharge  -     C. trachomatis/N. gonorrhoeae by AMP DNA  -     POCT Wet Prep  -     doxycycline (MONODOX) 100 MG capsule; Take 1 capsule (100 mg total) by mouth 2 (two) times daily. for 7 days  -     fluconazole (DIFLUCAN) 150 MG Tab; Take one tablet and repeat dose in 3 days    Yeast vaginitis  -     POCT Wet Prep  -     fluconazole (DIFLUCAN) 150 MG Tab; Take one tablet and repeat dose in 3 days    Screening for STD (sexually transmitted disease)  -     C. trachomatis/N. gonorrhoeae by AMP DNA    Varicose veins of vulva and perineum          Answers submitted by the patient for this visit:  Vaginal Pain Questionnaire (Submitted on 2/2/2024)  Chief Complaint: Vaginal pain  Chronicity: new  Onset: in the past 7 days  Frequency: daily  Progression since onset: waxing and waning  Pain severity: moderate  Affected side: left  Pregnant now?: No  abdominal pain: Yes  anorexia: No  chills: No  discolored urine: No  dysuria: No  fever: No  frequency: Yes  nausea: No  painful intercourse: Yes  rash: No  urgency: Yes  vomiting: No  Aggravated by: bowel movements, intercourse  treatments tried: acetaminophen, NSAIDs  Improvement on treatment: no relief  Sexual activity: sexually active  Partner with STD symptoms: no  Birth control: oral  contraceptives  Menstrual history: regular  STD: No  abdominal surgery: No   section: No  Ectopic pregnancy: No  Endometriosis: No  herpes simplex: No  gynecological surgery: No  menorrhagia: Yes  metrorrhagia: No  miscarriage: No  ovarian cysts: No  perineal abscess: No  PID: No  terminated pregnancy: No  vaginosis: No

## 2024-02-05 ENCOUNTER — PATIENT MESSAGE (OUTPATIENT)
Dept: ADMINISTRATIVE | Facility: OTHER | Age: 24
End: 2024-02-05
Payer: MEDICAID

## 2024-02-05 ENCOUNTER — HOSPITAL ENCOUNTER (OUTPATIENT)
Dept: RADIOLOGY | Facility: HOSPITAL | Age: 24
Discharge: HOME | End: 2024-02-05
Attending: NURSE PRACTITIONER
Payer: MEDICAID

## 2024-02-05 DIAGNOSIS — N94.10 DYSPAREUNIA IN FEMALE: ICD-10-CM

## 2024-02-05 PROCEDURE — 76856 US EXAM PELVIC COMPLETE: CPT | Mod: 26,,, | Performed by: RADIOLOGY

## 2024-02-05 PROCEDURE — 76830 TRANSVAGINAL US NON-OB: CPT | Mod: 26,,, | Performed by: RADIOLOGY

## 2024-02-05 PROCEDURE — 76830 TRANSVAGINAL US NON-OB: CPT | Mod: TC,PO

## 2024-02-06 DIAGNOSIS — N83.201 RIGHT OVARIAN CYST: Primary | ICD-10-CM

## 2024-02-07 ENCOUNTER — TELEPHONE (OUTPATIENT)
Dept: OBSTETRICS AND GYNECOLOGY | Facility: CLINIC | Age: 24
End: 2024-02-07
Payer: MEDICAID

## 2024-02-07 LAB
C TRACH DNA SPEC QL NAA+PROBE: NOT DETECTED
N GONORRHOEA DNA SPEC QL NAA+PROBE: NOT DETECTED

## 2024-02-07 NOTE — TELEPHONE ENCOUNTER
Peggy Ho, NP  2/6/2024  9:07 AM CST       Pelvic ultrasound shows cyst on right ovary - so no indication for the pain on left side. Make sure have daily bowel movement.  Would like to look at right ovary again in about 6 weeks to make sure cyst has resolved.     Order placed to be scheduled     Called patient and after verifying with 2 identifiers the above results discussed and u/s scheduled.

## 2024-02-07 NOTE — TELEPHONE ENCOUNTER
----- Message from Ara Alarcon sent at 2/6/2024  3:45 PM CST -----  Contact: NATALIYA  .Type:  Patient Returning Call    Who Called: Nataliya  Who Left Message for Patient: nurse  Does the patient know what this is regarding?: yes  Would the patient rather a call back or a response via [facility]?  Call   Best Call Back Number: [phone]                Thanks

## 2024-02-12 DIAGNOSIS — Z30.41 ENCOUNTER FOR SURVEILLANCE OF CONTRACEPTIVE PILLS: ICD-10-CM

## 2024-02-12 RX ORDER — NORGESTIMATE AND ETHINYL ESTRADIOL 7DAYSX3 28
1 KIT ORAL DAILY
Qty: 84 TABLET | Refills: 1 | Status: SHIPPED | OUTPATIENT
Start: 2024-02-12

## 2024-02-15 NOTE — PROGRESS NOTES
[facility]  MD Rhina Marshall NP-CAIN    Date of Service: 2/16/2024      Chief Complaint:   Nataliya Ibarra is a 24 y.o. adult presenting today for  6 month follow up. She is due for Physical Examination  She reports a new left breast lump that she first noticed 1 month ago.    History of Present Illness:   Ms. Ibarra first presented on April 14, 2023 due to a left breast lump that increasingly became more tender.  Imaging was normal in this area.  Excision of this area revealed mature adipose tissue compatible with a benign lipoma. MD:::Sarah Chambers NP  Past Medical History:   Diagnosis Date    Anxiety     Bipolar disorder     Breast pain, left 04/14/2023    Family history of malignant neoplasm of breast 02/16/2024    Mass of left breast 04/02/2023    Migraine headache     Obesity     Restless leg syndrome     Scoliosis       Past Surgical History:   Procedure Laterality Date    CYST REMOVAL      LEFT BREAST EXCISIONAL BIOPSY 6/20/2023      TYMPANOSTOMY TUBE PLACEMENT      Age 2        Current Outpatient Medications:     alprazolam (XANAX) 1 MG tablet, Take 1 tablet (1 mg total) by mouth 2 (two) times daily as needed for Anxiety., Disp: 12 tablet, Rfl: 0    cholecalciferol, vitamin D3, 1,250 mcg (50,000 unit) capsule, Take 50,000 Units by mouth every 7 days., Disp: , Rfl:     ferrous sulfate (IRON ORAL), Take by mouth., Disp: , Rfl:     FLUoxetine 20 MG capsule, Take 20 mg by mouth 3 (three) times daily., Disp: , Rfl:     TRI-SPRINTEC, 28, 0.18/0.215/0.25 mg-35 mcg (28) tablet, Take 1 tablet by mouth once daily., Disp: 84 tablet, Rfl: 1    butalbital-acetaminophen-caffeine -40 mg (FIORICET, ESGIC) -40 mg per tablet, Take 1 tablet by mouth every 6 (six) hours as needed., Disp: , Rfl:    Review of patient's allergies indicates:  No Known Allergies   Social History     Tobacco Use    Smoking status: Never    Smokeless tobacco: Never    Substance Use Topics    Alcohol use: Yes     Comment: occ      Family History   Problem Relation Age of Onset    Breast cancer Mother 45        45 and the second after 50; Gene neg    Cancer Mother         lymph node    Diabetes Mother         Type 2    Obesity Mother     Melanoma Father     Hyperlipidemia Maternal Grandmother     Osteoporosis Maternal Grandmother     Hyperlipidemia Maternal Grandfather     Hypertension Maternal Grandfather     Kidney disease Maternal Grandfather     Lupus Paternal Grandmother     Ovarian cancer Maternal Great-Grandmother         Review of Systems   Integumentary:  Negative for color change, rash, mole/lesion, breast mass, breast discharge and breast tenderness.   Breast: Negative for mass and tenderness       Physical Exam   HENT:   Head: Normocephalic.   Pulmonary/Chest: Right breast exhibits no inverted nipple, no mass, no nipple discharge, no skin change and no tenderness. Left breast exhibits no inverted nipple, no mass (no palpable mass noted in her left LIQ AOC), no nipple discharge, no skin change and no tenderness. No breast swelling.   Genitourinary: No breast swelling.   Musculoskeletal: Lymphadenopathy:      Upper Body:      Right upper body: No supraclavicular or axillary adenopathy.      Left upper body: No supraclavicular or axillary adenopathy.     Neurological: Nataliya Ibarra is alert.          Assessment/Plan  1. Breast lump on left side at 9 o'clock position  Assessment & Plan:  We reviewed our findings today and her questions were answered.  She understands that her exams have remained stable (and show nothing concerning).  She is comfortable being followed in a conservative fashion.      She understands the importance of monthly self-breast examination and knows to report any and all changes as they occur.          2. Breast pain, left  Assessment & Plan:  We discussed our fibrocystic mastopathy protocol in detail. She knows that if she follows this  protocol - that her symptoms should improve.  We discussed how breast pain is usually not associated with breast cancer, however, pain can be the presenting symptom with some cancers (but this could be coincidental). Still, if her pain does not improve in 8-12 weeks she should call us back for additional recommendations.'      3. Mass of lower inner quadrant of left breast  Assessment & Plan:  Normal tissue noted in her AOC. No palpable mass. They actually feel the same. She's comfortable being followed conservatively and will notify me of any and all changes as they occur.      4. Family history of malignant neoplasm of breast  Assessment & Plan:  Her mom was diagnosed with Breast cancer at 45. We will calculate her risk of breast cancer and start the HRC at 33. She understands and agrees with this plan.              Medical Decision Making:  It is my impression that this patient suffers all conditions contained in this medical document.  Each of these conditions did affect our plan of care and my medical decision making today.  It is my opinion that the medical decision making concerning this patient was of moderate difficulty based on the aforementioned conditions.  Any further recommendations will be communicated to the patient by me.  I have reviewed and verified her allergies, list of medications, medical and surgical histories, social history, and a pertinent review of symptoms.      Follow up:  1 year and PRN     For: PE

## 2024-02-15 NOTE — ASSESSMENT & PLAN NOTE
We reviewed our findings today and her questions were answered.  She understands that her exams have remained stable (and show nothing concerning).  She is comfortable being followed in a conservative fashion.      She understands the importance of monthly self-breast examination and knows to report any and all changes as they occur.

## 2024-02-16 ENCOUNTER — OFFICE VISIT (OUTPATIENT)
Dept: SURGERY | Facility: CLINIC | Age: 24
End: 2024-02-16
Payer: MEDICAID

## 2024-02-16 DIAGNOSIS — N63.24 MASS OF LOWER INNER QUADRANT OF LEFT BREAST: ICD-10-CM

## 2024-02-16 DIAGNOSIS — N63.25 BREAST LUMP ON LEFT SIDE AT 9 O'CLOCK POSITION: Primary | ICD-10-CM

## 2024-02-16 DIAGNOSIS — N64.4 BREAST PAIN, LEFT: ICD-10-CM

## 2024-02-16 DIAGNOSIS — Z80.3 FAMILY HISTORY OF MALIGNANT NEOPLASM OF BREAST: ICD-10-CM

## 2024-02-16 PROCEDURE — 99999 PR PBB SHADOW E&M-EST. PATIENT-LVL II: CPT | Mod: PBBFAC,,, | Performed by: NURSE PRACTITIONER

## 2024-02-16 PROCEDURE — 99213 OFFICE O/P EST LOW 20 MIN: CPT | Mod: S$PBB,,, | Performed by: NURSE PRACTITIONER

## 2024-02-16 PROCEDURE — 1160F RVW MEDS BY RX/DR IN RCRD: CPT | Mod: CPTII,,, | Performed by: NURSE PRACTITIONER

## 2024-02-16 PROCEDURE — 1159F MED LIST DOCD IN RCRD: CPT | Mod: CPTII,,, | Performed by: NURSE PRACTITIONER

## 2024-02-16 PROCEDURE — 99212 OFFICE O/P EST SF 10 MIN: CPT | Mod: PBBFAC,PN | Performed by: NURSE PRACTITIONER

## 2024-02-16 NOTE — ASSESSMENT & PLAN NOTE
Her mom was diagnosed with Breast cancer at 45. We will calculate her risk of breast cancer and start the HRC at 33. She understands and agrees with this plan.

## 2024-02-16 NOTE — ASSESSMENT & PLAN NOTE
Normal tissue noted in her AOC. No palpable mass. They actually feel the same. She's comfortable being followed conservatively and will notify me of any and all changes as they occur.

## 2024-03-19 ENCOUNTER — HOSPITAL ENCOUNTER (OUTPATIENT)
Dept: RADIOLOGY | Facility: HOSPITAL | Age: 24
Discharge: HOME | End: 2024-03-19
Attending: NURSE PRACTITIONER
Payer: MEDICAID

## 2024-03-19 DIAGNOSIS — N83.201 RIGHT OVARIAN CYST: ICD-10-CM

## 2024-03-22 ENCOUNTER — HOSPITAL ENCOUNTER (OUTPATIENT)
Dept: RADIOLOGY | Facility: HOSPITAL | Age: 24
Discharge: HOME | End: 2024-03-22
Attending: NURSE PRACTITIONER
Payer: MEDICAID

## 2024-03-22 PROCEDURE — 76856 US EXAM PELVIC COMPLETE: CPT | Mod: TC

## 2024-03-22 PROCEDURE — 76856 US EXAM PELVIC COMPLETE: CPT | Mod: 26,,, | Performed by: RADIOLOGY

## 2024-03-22 PROCEDURE — 76830 TRANSVAGINAL US NON-OB: CPT | Mod: 26,,, | Performed by: RADIOLOGY

## 2024-03-22 PROCEDURE — 76830 TRANSVAGINAL US NON-OB: CPT | Mod: TC

## 2024-03-25 ENCOUNTER — PATIENT MESSAGE (OUTPATIENT)
Dept: OBSTETRICS AND GYNECOLOGY | Facility: CLINIC | Age: 24
End: 2024-03-25
Payer: MEDICAID

## 2024-03-25 DIAGNOSIS — R10.84 GENERALIZED ABDOMINAL PAIN: Primary | ICD-10-CM

## 2024-03-25 DIAGNOSIS — N94.10 DYSPAREUNIA IN FEMALE: ICD-10-CM

## 2024-03-25 DIAGNOSIS — N83.201 RIGHT OVARIAN CYST: ICD-10-CM

## 2024-03-25 DIAGNOSIS — R10.2 PELVIC PAIN IN FEMALE: ICD-10-CM

## 2024-04-03 ENCOUNTER — HOSPITAL ENCOUNTER (OUTPATIENT)
Dept: RADIOLOGY | Facility: HOSPITAL | Age: 24
Discharge: HOME | End: 2024-04-03
Attending: NURSE PRACTITIONER
Payer: MEDICAID

## 2024-04-03 DIAGNOSIS — N83.201 RIGHT OVARIAN CYST: ICD-10-CM

## 2024-04-03 DIAGNOSIS — R10.2 PELVIC PAIN IN FEMALE: ICD-10-CM

## 2024-04-03 DIAGNOSIS — N94.10 DYSPAREUNIA IN FEMALE: ICD-10-CM

## 2024-04-03 DIAGNOSIS — R10.84 GENERALIZED ABDOMINAL PAIN: ICD-10-CM

## 2024-04-03 PROCEDURE — 74177 CT ABD & PELVIS W/CONTRAST: CPT | Mod: TC

## 2024-04-03 PROCEDURE — A9698 NON-RAD CONTRAST MATERIALNOC: HCPCS | Performed by: NURSE PRACTITIONER

## 2024-04-03 PROCEDURE — 25500020 PHARM REV CODE 255: Performed by: NURSE PRACTITIONER

## 2024-04-03 PROCEDURE — 74177 CT ABD & PELVIS W/CONTRAST: CPT | Mod: 26,,, | Performed by: RADIOLOGY

## 2024-04-03 RX ADMIN — IOHEXOL 100 ML: 350 INJECTION, SOLUTION INTRAVENOUS at 08:04

## 2024-04-03 RX ADMIN — IOHEXOL 1000 ML: 12 SOLUTION ORAL at 07:04

## 2024-04-26 ENCOUNTER — HOSPITAL ENCOUNTER (OUTPATIENT)
Dept: RADIOLOGY | Facility: HOSPITAL | Age: 24
Discharge: HOME | End: 2024-04-26
Attending: NURSE PRACTITIONER
Payer: MEDICAID

## 2024-04-26 DIAGNOSIS — G43.109 MIGRAINE WITH PROLONGED AURA, NOT INTRACTABLE: ICD-10-CM

## 2024-04-26 PROCEDURE — 70450 CT HEAD/BRAIN W/O DYE: CPT | Mod: 26,,, | Performed by: RADIOLOGY

## 2024-04-26 PROCEDURE — 70450 CT HEAD/BRAIN W/O DYE: CPT | Mod: TC,PO

## 2024-11-01 ENCOUNTER — HOSPITAL ENCOUNTER (OUTPATIENT)
Dept: RADIOLOGY | Facility: HOSPITAL | Age: 24
Discharge: HOME | End: 2024-11-01
Attending: NURSE PRACTITIONER
Payer: COMMERCIAL

## 2024-11-01 DIAGNOSIS — R33.9 RETENTION OF URINE, UNSPECIFIED: ICD-10-CM

## 2024-11-01 DIAGNOSIS — M79.671 RIGHT FOOT PAIN: ICD-10-CM

## 2024-11-01 PROCEDURE — 73620 X-RAY EXAM OF FOOT: CPT | Mod: TC,RT

## 2024-11-01 PROCEDURE — 76857 US EXAM PELVIC LIMITED: CPT | Mod: 26,,, | Performed by: RADIOLOGY

## 2024-11-01 PROCEDURE — 76857 US EXAM PELVIC LIMITED: CPT | Mod: TC

## 2024-11-01 PROCEDURE — 73620 X-RAY EXAM OF FOOT: CPT | Mod: 26,RT,, | Performed by: RADIOLOGY

## 2025-01-15 ENCOUNTER — PATIENT MESSAGE (OUTPATIENT)
Dept: SURGERY | Facility: CLINIC | Age: 25
End: 2025-01-15
Payer: COMMERCIAL

## 2025-02-17 ENCOUNTER — OFFICE VISIT (OUTPATIENT)
Dept: SURGERY | Facility: CLINIC | Age: 25
End: 2025-02-17
Payer: COMMERCIAL

## 2025-02-17 VITALS — HEIGHT: 61 IN | BODY MASS INDEX: 49.47 KG/M2 | WEIGHT: 262 LBS

## 2025-02-17 DIAGNOSIS — N63.25 BREAST LUMP ON LEFT SIDE AT 9 O'CLOCK POSITION: Primary | ICD-10-CM

## 2025-02-17 DIAGNOSIS — N63.24 MASS OF LOWER INNER QUADRANT OF LEFT BREAST: ICD-10-CM

## 2025-02-17 DIAGNOSIS — Z80.3 FAMILY HISTORY OF MALIGNANT NEOPLASM OF BREAST: ICD-10-CM

## 2025-02-17 DIAGNOSIS — N64.4 BREAST PAIN, LEFT: ICD-10-CM

## 2025-02-17 PROCEDURE — 99213 OFFICE O/P EST LOW 20 MIN: CPT | Mod: S$GLB,,, | Performed by: NURSE PRACTITIONER

## 2025-02-17 NOTE — ASSESSMENT & PLAN NOTE
Her previous AOC remains consistent with normal tissue on exam. She will notify me of any and all changes as they occur.

## 2025-02-17 NOTE — ASSESSMENT & PLAN NOTE
Her Lifetime Risk is 21.88% based on the [employer] Tool.  Her family and personal history were confirmed, and I believe this risk to be true based on its limitations.  She knows that there are certain elements that increase/decrease her risk that are not amendable to .  This risk is significant and according to the American Cancer Society and other National Guidelines she qualifies for an Annual MRI in addition to her Annual Mammogram.  We will separate these two imaging tests by 6 months and will coordinate as part of our High-Risk Clinic starting at age 33 since her mom was diagnosed at 45. She will have an annual PE with us and an annual PE with her GYN until 33.     We had a long discussion concerning Breast Cancer Risk Reduction and current NCCN Guidelines. She knows that her risk can be lowered slightly with a healthy lifestyle and minimal ETOH use. Being physically active will also help. She should reduce or stay away from OCPs and HRT. Tamoxifen can be used for 5 years to lower her risk, but nothing is 100% effective. Even Risk Reduction Mastectomy can be entertained in certain cases, but it only lowers the risk by about 95% (not to zero). Ongoing exam and intermittent imaging may still be required. I mentioned seeing Dr. Acuna concerning further discussions should she ever desire.     We will want to see her anytime she has any breast issue and will have a very low threshold for additional testing/tissue sampling/etc. Her questions were answered to her satisfaction and we are available for any questions in the future. She was told to notify us should her personal or family history change with respect to breast and ovarian cancer.

## 2025-02-17 NOTE — PROGRESS NOTES
[facility]    BEN Lloyd        Date of Service: 2/17/2025    Chief Complaint:   Nataliya Ibarra is a 25 y.o. adult presenting today for her annual evaluation.  She is due for a Physical Examination She reports no interval changes except occasional left breast pain that comes and goes.     History of Present Illness:   Ms. Ibarra first presented on April 14, 2023 due to a left breast lump that increasingly became more tender.  Imaging was normal in this area.  Excision of this area revealed mature adipose tissue compatible with a benign lipoma. MD:::Sarah Chambers NP     Past Medical History:   Diagnosis Date    Anxiety     Bipolar disorder     Breast pain, left 04/14/2023    Family history of malignant neoplasm of breast 02/16/2024    Mass of left breast 04/02/2023    Migraine headache     Obesity     Restless leg syndrome     Scoliosis       Past Surgical History:   Procedure Laterality Date    CYST REMOVAL      LEFT BREAST EXCISIONAL BIOPSY 6/20/2023      TYMPANOSTOMY TUBE PLACEMENT      Age 2      Current Medications[1]   Review of patient's allergies indicates:  No Known Allergies   Social History     Tobacco Use    Smoking status: Never    Smokeless tobacco: Never   Substance Use Topics    Alcohol use: Yes     Comment: occ      Family History   Problem Relation Name Age of Onset    Breast cancer Mother Megha 45        45 and the second after 50; Gene neg    Cancer Mother Melcarolina         lymph node    Diabetes Mother Melaine         Type 2    Obesity Mother Melaine     Melanoma Father Ted     Hyperlipidemia Maternal Grandmother      Osteoporosis Maternal Grandmother      Hyperlipidemia Maternal Grandfather      Hypertension Maternal Grandfather      Kidney disease Maternal Grandfather      Lupus Paternal Grandmother      Ovarian cancer Maternal Great-Grandmother          Review of Systems   Integumentary:  Negative for color change, rash,  mole/lesion, breast mass, breast discharge and breast tenderness.   Breast: Negative for mass and tenderness       Physical Exam   HENT:   Head: Normocephalic.   Pulmonary/Chest: Right breast exhibits no inverted nipple, no mass, no nipple discharge, no skin change and no tenderness. Left breast exhibits no inverted nipple, no mass, no nipple discharge, no skin change and no tenderness. No breast swelling.   Genitourinary: No breast swelling.   Musculoskeletal: Lymphadenopathy:      Upper Body:      Right upper body: No supraclavicular or axillary adenopathy.      Left upper body: No supraclavicular or axillary adenopathy.     Neurological: Nataliya is alert.          ASSESSMENT and PLAN OF CARE     1. Breast lump on left side at 9 o'clock position  Assessment & Plan:  We reviewed our findings today and her questions were answered.  She understands that her exams have remained stable (and show nothing concerning).  She is comfortable being followed in a conservative fashion.      She understands the importance of monthly self-breast examination and knows to report any and all changes as they occur.          2. Breast pain, left  Assessment & Plan:  We discussed our fibrocystic mastopathy protocol in detail. She knows that if she follows this protocol - that her symptoms should improve.  We discussed how breast pain is usually not associated with breast cancer, however, pain can be the presenting symptom with some cancers (but this could be coincidental). Still, if her pain does not improve in 8-12 weeks she should call us back for additional recommendations.'      3. Mass of lower inner quadrant of left breast  Assessment & Plan:  Her previous AOC remains consistent with normal tissue on exam. She will notify me of any and all changes as they occur.       4. Family history of malignant neoplasm of breast  Assessment & Plan:  Her Lifetime Risk is 21.88% based on the [employer] Tool.  Her family and personal  history were confirmed, and I believe this risk to be true based on its limitations.  She knows that there are certain elements that increase/decrease her risk that are not amendable to .  This risk is significant and according to the American Cancer Society and other National Guidelines she qualifies for an Annual MRI in addition to her Annual Mammogram.  We will separate these two imaging tests by 6 months and will coordinate as part of our High-Risk Clinic starting at age 33 since her mom was diagnosed at 45. She will have an annual PE with us and an annual PE with her GYN until 33.     We had a long discussion concerning Breast Cancer Risk Reduction and current NCCN Guidelines. She knows that her risk can be lowered slightly with a healthy lifestyle and minimal ETOH use. Being physically active will also help. She should reduce or stay away from OCPs and HRT. Tamoxifen can be used for 5 years to lower her risk, but nothing is 100% effective. Even Risk Reduction Mastectomy can be entertained in certain cases, but it only lowers the risk by about 95% (not to zero). Ongoing exam and intermittent imaging may still be required. I mentioned seeing Dr. Acuna concerning further discussions should she ever desire.     We will want to see her anytime she has any breast issue and will have a very low threshold for additional testing/tissue sampling/etc. Her questions were answered to her satisfaction and we are available for any questions in the future. She was told to notify us should her personal or family history change with respect to breast and ovarian cancer.             Medical Decision Making: It is my impression that this patient suffers all conditions contained in this medical document.  Each of these conditions did affect our plan of care and my medical decision making today.  It is my opinion that the medical decision making concerning this patient was of minimal  difficulty based on the  aforementioned conditions.  Any further recommendations will be communicated to the patient by me.  I have reviewed and verified her allergies, list of medications, medical and surgical histories, social history, and a pertinent review of symptoms.     Follow up:  1 year and PRN    For: PE                 [1]   Current Outpatient Medications:     alprazolam (XANAX) 1 MG tablet, Take 1 tablet (1 mg total) by mouth 2 (two) times daily as needed for Anxiety., Disp: 12 tablet, Rfl: 0    butalbital-acetaminophen-caffeine -40 mg (FIORICET, ESGIC) -40 mg per tablet, Take 1 tablet by mouth every 6 (six) hours as needed., Disp: , Rfl:     cholecalciferol, vitamin D3, 1,250 mcg (50,000 unit) capsule, Take 50,000 Units by mouth every 7 days., Disp: , Rfl:     ferrous sulfate (IRON ORAL), Take by mouth., Disp: , Rfl:     FLUoxetine 20 MG capsule, Take 20 mg by mouth 3 (three) times daily., Disp: , Rfl: